# Patient Record
(demographics unavailable — no encounter records)

---

## 2019-06-28 NOTE — CT
CT arteriogram neck with IV contrast and 3-D imaging

CT arteriogram head with IV contrast and 3-D imaging

CT brain with IV contrast



HISTORY: Altered mental status. TIA. Vascular disease.



FINDINGS: Mild emphysematous changes at the lung apices. Normal branching of the great vessels at the
 aortic arch with minimal arterial calcification. Good flow into and throughout each carotid and

vertebral system. Carotid bifurcations are widely patent. Significant calcification and stenosis limi
jeanette to the supraclinoid portion of the internal carotid arteries bilaterally. Good flow into each

cerebral and cerebellar system. [place] is patent.







IMPRESSION: Atherosclerosis. No acute vascular abnormalities or acute intracranial abnormalities are 
demonstrated.







Findings were called to Dr. Nam in the emergency department at 1151 hours.



Code CR.



Reported By: MAXWELL Hudson 

Electronically Signed:  6/28/2019 11:58 AM

## 2019-06-28 NOTE — RAD
THREE VIEWS LEFT WRIST:

6/28/19

 

HISTORY: 

Fall. Pain. 

 

FINDINGS: 

Extensive atherosclerotic disease. Intercarpal and radiocarpal joint space is preserved. No fracture.
 No cortical irregularity or periosteal reaction.

 

IMPRESSION: 

1.      No fracture. 

2.      Atherosclerotic disease.

 

POS: PPP

## 2019-06-28 NOTE — CT
CT BRAIN WITHOUT CONTRAST:



HISTORY:Left-sided weakness, level 2 stroke, slurred speech and drooping of the left-sided amount



COMPARISON:3/20/2019



FINDINGS:

There are foci of decreased attenuation in the periventricular white matter, consistent with chronic 
small vessel ischemic disease. Old infarctions in the right frontal and parietal lobes are again

seen.



No evidence of acute infarct, hemorrhage, midline shift or abnormal extra-axial fluid collections is 
seen. The ventricular size is appropriate and the basilar cisterns are patent. The bony calvarium

is intact.



The visualized paranasal sinuses and mastoid air cells are well aerated.



IMPRESSION: No CT evidence of acute intracranial process.



Discussed over the telephone with ER physician Dr. Rc Nam at 11:40 AM



Reported By: Anup Moreira 

Electronically Signed:  6/28/2019 11:42 AM

## 2019-06-28 NOTE — CT
CT cervical spine noncontrast



HISTORY: Fall. Neck injury.



FINDINGS: Vertebral body heights are maintained. Disc space narrowing most pronounced at the C3-4, C4
-5, and C6-7 levels. Minimal degenerative spondylolisthesis at the C5-6 level. Minimal degenerative

retrolisthesis at the C6-7 level. Multilevel severe central canal and foraminal stenoses. Central can
al stenosis most severe at the C4-5 level.



Cervicothoracic junction is intact. No acute fracture or dislocation.







IMPRESSION: Prominent degenerative changes throughout the cervical spine. No acute osseous abnormalit
ies are demonstrated.



Reported By: MAXWELL Hudson 

Electronically Signed:  6/28/2019 11:42 AM

## 2019-06-28 NOTE — RAD
FOUR VIEWS LEFT ELBOW:

6/28/19

 

HISTORY: 

Pain. Fall.

 

FINDINGS: 

No joint effusion. No fracture. No cortical irregularity or periosteal reaction.

 

IMPRESSION: 

No posttraumatic change. 

 

POS: PPP

## 2019-06-29 NOTE — CON
DATE OF CONSULTATION:  



REASON FOR CONSULTATION:  Clear cervical spine.



REQUESTING PHYSICIAN:  Dr. Goddard.



HISTORY OF PRESENT ILLNESS:  The patient is a 57-year-old  man, 
who

reportedly fell out of bed.  The patient is known to have dense paralysis on his

right side.  He thought he was able to roll in bed, but misjudged and had 
fallen out

of bed, hitting his right forehead, was noted to have some altered mental status

changes and some decreased  strength on the left.  The patient was brought 
to

the emergency department where he underwent evaluation, examination for a level 
2

stroke.  His CT evaluations were unremarkable for signs of acute stroke.  He was

having some neck pain, so he was left in an [place] collar.  We have been asked to

evaluate his C-spine to see, if he can be removed from his collar. 



ALLERGIES:  NONE.



CURRENT MEDICATIONS:  

1. Simvastatin.

2. Metformin.

3. Glipizide.

4. Lisinopril.

5. Keppra.

6. Aspirin.

7. Pioglitazone.



PAST MEDICAL HISTORY:  Hyperlipidemia, ischemic CVA, TIA, diabetes, 
hypertension.



PAST SURGICAL HISTORY:  Left lower extremity BKA due to a forklift accident.



SOCIAL HISTORY:  The patient denies drug use for the past 20 years.  He denies

alcohol and is a former smoker, though does occasionally smoke cigars. 



FAMILY MEDICAL HISTORY:  Hypertension, diabetes.



REVIEW OF SYSTEMS:  A 10-point review of systems is negative as otherwise 
stated.



PHYSICAL EXAMINATION:

GENERAL:  The patient is currently resting comfortably in a stroke observation 
bed.

He is awake, responsive.  Shane Coma Scale is 15. 

HEENT:  Head, he has a laceration to the right forehead just supraorbital that 
has

been repaired in the ER.  Eyes are PERRLA.  Extraocular motion intact.  Ears are

atraumatic without discharge.  Externally, there is some dry crusted blood in 
the

right naris.  Oropharynx is clear. 

NECK:  Immobilized in an [place] collar.  Palpation to the posterior neck reveals

tenderness to the midline and also some left greater than right paraspinous

tenderness.  His cervical collar was returned and secured.  It is well fitting,

there is no tracheal deviation, jugular vein distention. 

LUNGS:  Clear to auscultation with good inspiratory and expiratory effort. 

HEART:  Regular rate and rhythm. 

ABDOMEN:  Soft, flat and nontender with active bowel sounds. 

PELVIS:  Stable. 

EXTREMITIES:  As previously noted, left BKA.  Right upper and lower extremity 
dense

paralysis.  The patient has what appears to be a significant flexion 
contracture on

the right, also keeping his arm crossed at his chest.  The left upper extremity

shows weak, but mobile at the shoulder with abduction, abduction.  The patient 
has

flexion and extension of the elbow, flexion and extension weakly at the wrist,

primarily dorsiflexion of the wrist, has 1 to 2+ strength of  and extension 
of

his fingers. 

BACK:  Nontender.



LABORATORY FINDINGS:  White blood cell count 10.0, hemoglobin 12.0, hematocrit 
36.6,

platelets 184.  Sodium 138, potassium 4.5, chloride 109, CO2 of 20, BUN 12,

creatinine 0.79, glucose 119.  LFTs are unremarkable.  Troponin is less than 
0.010.

PT 13, INR 1.0, PTT 29. 



RADIOGRAPHIC REPORTS:  

1. CT of the brain without contrast shows no CT evidence of acute intracranial

process. 

2. CT of the C-spine and head with IV contrast shows atherosclerosis.  No acute

vascular abnormality or acute intracranial abnormalities demonstrated. 

3. CT of the C-spine without contrast shows prominent degenerative changes

throughout the cervical spine.  No acute osseous abnormalities are 
demonstrated. 



ASSESSMENT/PLAN:  

1. Status post fall.

2. Altered mental status, resolving.

3. Forehead laceration.

4. Cervicalgia.

5. Left upper extremity neurapraxia.



PLAN:  Will be to get plain radiographs of his elbow and left wrist to evaluate 
for

possible fractures causing radial nerve type injury, doubtful.  We will re-
evaluate

the patient tomorrow.  We will leave his [place] collar in place.  If he does not 
have

significant improvement overnight or if there is a change, consider MRI of the

C-spine and possibly shoulder, though these can certainly be done in a 
nonemergent

fashion. 







Job ID:  [id number]



[facility]

## 2019-06-29 NOTE — MRI
Exam: MRI cervical spine without contrast



HISTORY: Recent fall. Extremity weakness..



COMPARISON: None



FINDINGS:

 Heterogeneous marrow signal intensity of the cervical vertebra likely due to senescent change. Cervi
xiomara spine vertebral body height is maintained. No fracture.

3 mm of retrolisthesis of C4 upon C5, 3 mm of retrolisthesis C6 upon C7.

No significant STIR hyperintensity to suggest vertebral body edema or ligamentous injury. There are t
ype I Modic changes at the C4-C5.

Visualized brain parenchyma, cervicomedullary junction and the upper thoracic cord have a normal size
 and signal intensity. Starting at the superior endplate of C5 and extending inferiorly to the

superior endplate of C6 there is a central T2 hyperintensity predominantly involves the central gray 
matter and preserved the peripheral white matter. Cord edema favored. There is no cord expansion or

volume loss.



C2-C3: No significant central canal stenosis. Mild right foraminal narrowing. Left neural foramen is 
patent.



C3-C4: Broad-based disc osteophyte complex effaces the ventral subarachnoid space and deforms the jb
tral cord. Moderate central canal stenosis. Moderate bilateral foraminal narrowing due to

uncovertebral hypertrophy



C4-C5: Broad-based disc osteophyte complex deforms the thecal sac and causes mass effect upon the cer
vical cord. Moderate to severe central canal stenosis. Severe right and moderate to severe left

foraminal narrowing due to uncovertebral hypertrophy.



C5-C6: Broad-based disc osteophyte complex abuts the thecal sac. Ventral subarachnoid space is still 
maintained. Mild central canal stenosis. Mild right foraminal narrowing. Left neural foramen is

patent



C6-C7: Broad-based disc osteophyte complex abuts the thecal sac. Ventral subarachnoid space is mainta
ined. Mild central canal stenosis. Moderate bilateral foraminal narrowing due to uncovertebral

hypertrophy.



C7-T1: Broad-based discussed by complex abuts the thecal sac. Subarachnoid space is maintained. Mild 
to moderate central canal stenosis. Moderate to severe bilateral foraminal narrowing due to

uncovertebral hypertrophy.



IMPRESSION:

 

1. Degenerative changes of the cervical spine as detailed above.

2. No evidence of a cervical spine fracture

3. Central T2 hyperintensity involving the cervical cord at C5 and C6, favored to be due to edema. Th
ere is no evidence of cord expansion to suggest a malignant process. There is no evidence of volume

loss is suggested cord malacia. As a conservative measure, follow-up imaging can be performed in one 
month.



Results of study discussed with Dr. Ventura   on 6/29/19  at 4:29 p.m.



Code CR



Transcribed Date/Time: 6/29/2019 5:32 PM



Reported By: Agueda Francois 

Electronically Signed:  6/29/2019 5:35 PM

## 2019-06-29 NOTE — MRI
Exam: MRI thoracic spine without contrast



History: Weakness. Fall. Pain.



Comparison: None



FINDINGS:

Heterogeneous marrow signal intensity of the thoracic vertebra likely representing senescent change. 
Vertebral body height is maintained. There is no fracture. No significant STIR hyperintensity to

suggest vertebral body edema or ligamentous injury.

Visualized mediastinum, and solid organs are grossly unremarkable. Dependent atelectatic changes in t
he lung parenchyma.

The overall AP diameter the central spinal canal is narrowed secondary to congenitally foreshortened 
pedicles

There are degenerative changes in the distal cervical spine. Refer to separate cervical spine MRI rep
ort for further detail

Throughout the thoracic spine, there is no evidence of high-grade central canal stenosis. There is so
me mass effect in the right hemicord at T3-T4 with slight deformity of the cord secondary to a

small right paracentral disc. There is no evidence of cord expansion. No definite cord malacia. No co
rd signal abnormality. Conus medullaris terminates beyond the T12 level.



IMPRESSION:

1. No evidence of a thoracic spine fracture

2. Overall decreased AP diameter of the central spinal canal secondary to congenitally foreshortened 
pedicle. No evidence of high-grade central canal stenosis or high-grade foraminal narrowing.

3. No cord signal abnormality.



Transcribed Date/Time: 6/29/2019 5:35 PM



Reported By: Agueda Francois 

Electronically Signed:  6/29/2019 5:38 PM

## 2019-06-29 NOTE — MRI
Exam: Brain MRI without contrast



HISTORY: Left-sided weakness. Slurred speech. Left facial droop.



COMPARISON: 3/21/2019



FINDINGS: 

Calvarial marrow signal intensity: Appropriate T1 signal

Gradient echo sequence: No acute hemorrhage. Stable hypointense foci on the axial gradient echo seque
nce involving the left caudate nucleus and right subependymal region due to remote insult.

Brain parenchyma: No mass, mass effect or midline shift. Age-appropriate atrophy. Focus of malacic an
d gliotic changes involving the right frontoparietal region.

Cortical gray-white matter differentiation: Preserved

Restricted diffusion: Central arterial flow voids are maintained. Absent restricted diffusion

White matter signal intensities: T2, FLAIR white matter hyperintensities due to chronic small vessel 
ischemic changes



Sinuses: Adequate aeration of the paranasal sinuses and mastoid air cells. 





IMPRESSION:

1. Absent restricted diffusion. No acute infarct

2. Stable chronic small vessel ischemic changes. Stable malacic and gliotic change involving the righ
t frontoparietal region.



Reported By: Agueda Francois 

Electronically Signed:  6/29/2019 1:49 PM

## 2019-06-30 NOTE — CON
DATE OF CONSULTATION:  06/29/2019



HISTORY OF PRESENT ILLNESS:  This is a 57-year-old  man with a

previous history of multifocal cerebrovascular accidents, which dates back to 2014, 2017 and most recently March of 2019.  This has left the patient with residual right

hemiparesis.  The patient is also status post left BKA and has limited mobility

using left limb prosthesis.  He was admitted yesterday following a fall off a bed.

The patient apparently rolled off a bed, landing hard on his right side and was

discovered by family several hours after the incident.  At baseline, the patient is

able to feed himself holding the spoon using the left hand.  Following the fall

beyond his baseline right hemiparesis, the patient has developed worsening left

sided weakness, especially with respect to the left upper extremity.  He is now

unable to make a fist or hold on to objects. 

His workup here has included a CT scan of the brain, which revealed no acute

traumatic injury or acute cerebrovascular accidents.  CT angiography of the brain

was also obtained, which revealed no acute vascular pathology.  CT scan of the

cervical spine reveals no acute fractures or dislocation.  I was asked to evaluate

the patient today to exclude other traumatic injuries.  At the time of my

evaluation, the patient is awake and alert.  His sister was at bedside.  His

cervical spine was maintained in a cervical collar.  The patient was complaining of

residual back and right lateral neck pain.  He denies any chest or abdominal pain.

He denies any dyspnea or syncope. 



PAST MEDICAL HISTORY:  Significant for multifocal cerebrovascular accidents with

residual right hemiparesis.  Other pertinent medical history includes type 2

diabetes mellitus, essential hypertension, and hyperlipidemia. 



PAST SURGICAL HISTORY:  Significant for left below-the-knee amputation following

work-related forklift accident.  He is also status post drainage of neck abscess. 



FAMILY HISTORY:  Pertinent for diabetes mellitus and essential hypertension.



SOCIAL HISTORY:  The patient is currently disabled.  He has no cigarette smoking,

ethanol, or illicit drug abuse aside from remote history of marijuana use. 



PRE-HOSPITAL MEDICATIONS:  Includes simvastatin 10 mg p.o. nightly, lisinopril 20 mg

p.o. daily, pioglitazone 30 mg p.o. daily, metformin 1000 mg p.o. b.i.d., Keppra 500

mg p.o. b.i.d., aspirin 325 mg p.o. daily and glipizide 5 mg p.o. b.i.d. 



ALLERGIES:  THE PATIENT HAS NO KNOWN DRUG ALLERGIES.



REVIEW OF SYSTEMS:  Essentially unremarkable except as stated in past medical

history and chief complaint. 



PHYSICAL EXAMINATION:

GENERAL:  Reveals a 57-year-old man with a previous history of cerebrovascular

accident who has right hemiparesis, otherwise appears to be in no acute distress at

the time of my evaluation. 

VITAL SIGNS:  His vital signs today includes blood pressure of 103/58, pulse 86,

respiratory rate is 15, temperature 99.3 degrees Fahrenheit, and oxygen saturation

97% on room air. 

HEENT:  Reveals normocephalic and atraumatic.  Pupils are equal, round, reactive to

light and accommodation. 

NECK:  He has no jugular venous distention noted.  Cervical spine is nontender to

palpation.  He has slight tenderness in the right lateral neck soft tissue.  He has

no cervical neck tenderness to active or passive range of motion. 

CHEST:  Chest wall is stable.  No gross deformities or step-offs are present. 

HEART:  Reveals regular rate and rhythm.  No murmurs or gallops auscultated. 

LUNGS:  Clear to auscultation bilaterally.  Breathing, regular and nonlabored. 

ABDOMEN:  Soft, nontender, nondistended. 

EXTREMITIES:  Reveal 2+ bilateral radial and right pedal pulses present.  Left BKA

stump is healed. 

MUSCULOSKELETAL:  Reveals 2/5 muscle strength in right upper extremity.  He has 3/5

muscle strength in the left upper extremity.  He is unable to make a fist.  He has

good deltoid and biceps function, but minimum intrinsic function of the left hand.

His right upper extremity is contractured from previous stroke.  Right lower

extremity motor function is 2/5. 

NEURO:  Shane Coma Scale otherwise is 15.



LABORATORY FINDINGS:  Today include a CBC with 5900 white blood cells, hemoglobin

and hematocrit 10.3 and 29.8 respectively.  Platelet count is 196,000.  Metabolic

profile; sodium 137, potassium 3.8, chloride is 107, bicarb is 21, BUN is 12,

creatinine is 0.77, glucose 108. 



IMAGING DATA:  I have personally reviewed all the previous cervical spine images,

which are essentially unremarkable for any acute pathology.  Brain MRI, which was

obtained today reveals stable multiple foci cerebral infarctions.  No acute

pathology was evident.  MRI of the cervical spine is remarkable for a broad-based

disk osteophyte in C3-C4 and C4-C5 causing moderate to severe canal stenosis.  Also

noted is evidence of spinal cord edema at C5 and C6.  There is no evidence of acute

fractures present.  MRI of the thoracic spine is unremarkable for any fractures or

dislocation. 



IMPRESSIONS:  

1. Status post fall.

2. Severe cervical spinal stenosis with associated central cord syndrome.

3. History of previous multifocal cerebral infarctions.

4. History of essential hypertension.

5. History of type 2 diabetes mellitus.



RECOMMENDATIONS:  

1. Cervical collar will be discontinued.

2. We will initiate physical and occupational therapy.

3. We will ask Neurosurgery to evaluate the patient with regard to the cervical

spinal stenosis and associated central cord syndrome. 

4. We will also ask PM and R to evaluate the patient eventually for possible

inpatient rehabilitation post discharge. 



Above findings and plan has been discussed with the patient and his sister at

bedside. 



I did answer their questions. 



Thank you again, Dr. Goddard, for allowing me the opportunity to participate in the

care of this patient. 







Job ID:  [id number]

## 2019-06-30 NOTE — PRG
DATE OF SERVICE:  06/30/2019



SUBJECTIVE:  Mr. Yeager is a 57-year-old  man, who was recently

admitted following a fall off the bed. 



He has central cord syndrome. 



He remains awake and alert today.  In fact, he is more interactive this morning with

me. 



His pain is adequately controlled on oral analgesics. 



He is tolerating diet. 



He remains with right hemiparesis. 



His left arm is a little stronger today, although he is still unable to make a fist.



OBJECTIVE:  VITAL SIGNS:  Include blood pressure 166/81, pulse 79, respiratory rate

16, temperature 97.8 degrees Fahrenheit, and oxygen saturation 98% on room air. 

HEART:  Reveals regular rate and rhythm.  No murmurs or gallops auscultated. 

LUNGS:  Clear to auscultation bilaterally.  Breathing, regular and nonlabored. 

NEUROLOGIC:  Stable.



IMPRESSION:  

1. Multiple-level spinal stenosis with central cord syndrome, status post fall off a

bed. 

2. History of multiple cerebrovascular accidents.



PLAN:  The patient is otherwise hemodynamically stable. 



Neurosurgery plans ACDF for later part of this coming week.







Job ID:  [id number]

## 2019-06-30 NOTE — RAD
Cervical spine 2 views flexion and extension



HISTORY: Preop planning.



COMPARISON: None



FINDINGS: Flexion-extension views of the cervical spine were forearm without evidence of abnormal mot
ion. Moderate arthritic changes of the spine are seen with degenerative disc narrowing most

pronounced at C4-5.



IMPRESSION: Arthritic changes of the spine.



Reported By: Goyo Prakash 

Electronically Signed:  6/30/2019 1:00 PM

## 2019-06-30 NOTE — PDOC.PN
- Subjective


Encounter Start Date: 06/30/19


Encounter Start Time: 08:54





Feels ok.  No specific complaints.





- Objective


Resuscitation Status - Order Detail:





06/28/19 16:45


Resuscitation Status Routine 


   Resuscitation Status: FULL: Full Resuscitation








Vital Signs & Weight: 


 Vital Signs (12 hours)











  Temp Pulse Resp BP Pulse Ox


 


 06/30/19 07:00  98.1 F  85  16  134/77  99


 


 06/30/19 03:52  98.4 F  70  16  140/77  96


 


 06/29/19 23:33  98.2 F  68  12  98/58 L  97








 Weight











Weight                         152 lb 4 oz














I&O: 


 











 06/29/19 06/30/19 07/01/19





 06:59 06:59 06:59


 


Intake Total 200 1150 


 


Output Total 300  


 


Balance -100 1150 











Result Diagrams: 


 06/29/19 05:59





 06/29/19 05:59


Additional Labs: 


 Accuchecks











  06/30/19 06/29/19 06/29/19





  05:44 21:01 16:52


 


POC Glucose  182 H  157 H  159 H














  06/29/19





  10:38


 


POC Glucose  146 H














Phys Exam





- Physical Examination


Constitutional: NAD


Respiratory: no wheezing, no rales, no rhonchi


Cardiovascular: RRR, no significant murmur


Gastrointestinal: soft, non-tender, no distention, positive bowel sounds


Musculoskeletal: no edema


L BKA


R hemiplegia.  L fingers non-functional.  LUE weakness.





Dx/Plan


(1) Cord compression syndrome


Code(s): G95.20 - UNSPECIFIED CORD COMPRESSION   Status: Acute   





(2) History of CVA (cerebrovascular accident)


Code(s): Z86.73 - PRSNL HX OF TIA (TIA), AND CEREB INFRC W/O RESID DEFICITS   

Status: Acute   





(3) History of left below knee amputation


Code(s): Z89.512 - ACQUIRED ABSENCE OF LEFT LEG BELOW KNEE   Status: Acute   





(4) DM2 (diabetes mellitus, type 2)


Status: Chronic   





(5) Dyslipidemia


Code(s): E78.5 - HYPERLIPIDEMIA, UNSPECIFIED   Status: Chronic   





(6) HTN (hypertension)


Code(s): I10 - ESSENTIAL (PRIMARY) HYPERTENSION   Status: Chronic   


Qualifiers: 


 





- Plan





* Cervical cord compression.


* Discussed with Trauma and Neurosurg.


* Plan is for surgery on Thursday.  Patient was taking Plavix and took his last 

dose on Thursday.


* On steroids.


* Will change famotidine to PPI.


* Will change the Heparin to Lovenox for DVT prophylaxis.  Will not to stop on 

Wed am.

## 2019-06-30 NOTE — CON
DATE OF TELEMEDICINE CONSULTATION WITH ANGELICA IBARRA:  19



CHIEF COMPLAINT:  Possible stroke.



HISTORY OF PRESENT ILLNESS:  The patient's fiancee was in the room and was able 
to

give me medical history.  The patient was in sleep and around 7 or 8 a.m. this

morning, he was rolling over and he thought he was further from the edge, but 
fell

down and he was noted to have left-sided weakness by the ER physician.  At 
baseline,

he has slurred speech and right-sided weakness from a stroke 2 months ago and 
his

left side had become weak since the fall and he is unable to use his left hand

particularly. 



PREVIOUS MEDICAL HISTORY:  Positive for acute CVA about 2 months ago and he also

hypertension, hyperlipidemia, and diabetes. 



PREVIOUS SURGICAL HISTORY:  Left below-knee amputation secondary to a forklift

accident, status post drainage of a neck abscess. 



FAMILY HISTORY:  Positive for diabetes in his brother.  He has 2 brothers and 2

sisters.  His father  at 48.  He was sick on the job and they think he had

cancer.  Mother is 73.  She has type 1 diabetes. 



SOCIAL HISTORY:  Lives in [place].  He is disabled.  He has no history of 
alcohol or

tobacco use.  He does not use any drugs.  He lives with his fiancee. 



CURRENT MEDICATIONS:  

1. Simvastatin 10 mg per day.

2. Metformin 1000 mg b.i.d.

3. Glipizide 5 mg b.i.d.

4. Lisinopril 10 mg per day.

5. Aspirin 81 mg per day.

6. Pioglitazone 30 mg per day.



ALLERGIES:  NO KNOWN DRUG ALLERGIES.



REVIEW OF SYSTEMS:  PULMONARY:  Negative for shortness of breath or cough. 

GASTROINTESTINAL:  Negative for any nausea, vomiting, or diarrhea. 

GENITOURINARY:  No increase in urinary frequency. 

NEUROLOGIC:  Positive for CVA and left-sided weakness. 

DERMATOLOGIC:  Negative for any rash. 

HEMATOLOGIC:  Negative for bleeding diathesis or anemia.



LABORATORY WORKUP:  White count 5.9, hemoglobin 10.3, hematocrit 29.8, platelets

196.  Chemistry; sodium 137, potassium 3.8, chloride 107, bicarb 21, BUN 12,

creatinine 0.77, glucose 108.  Cholesterol and lipid profile are negative.  MRI 
of

the brain was completed and he did not have an acute stroke.  He does have 
chronic

small-vessel ischemic changes, stable malacic and gliotic change involving the 
right

frontoparietal region and he had a cervical spine MRI which showed degenerative

changes of the C-spine, central T2 hyperintensity in the cervical cord at C5-6,

favored to be due to edema.  There is no evidence of cord expansion suggesting

malignant process.  No evidence of volume loss and thoracic spine MRI was also

completed and there was no evidence of any thoracic spine fracture.  He has

congenitally foreshortened pedicle, therefore has decreased diameter of central

spinal canal and no cord signal abnormality was noted. 



PHYSICAL EXAMINATION:

VITAL SIGNS:  Temperature 99.3, pulse 86, respiratory rate 15, O2 sats 97%, 
blood

pressure 103/58. 

GENERAL APPEARANCE:  Well-built, well-nourished man.  He has a laceration in the

right forehead and has left below-knee amputation. 

CHEST:  Clear vesicular breathing. 

CARDIOVASCULAR:  S1, S2 heard.  No murmurs. 

ABDOMEN:  Soft. 

NEUROLOGICAL EXAMINATION:  Higher intellectual functions.  Oriented to time, 
place,

and person and appropriate conversation.  Cranial nerves, pupils are 3 mm, 
briskly

reactive to light bilaterally and no facial asymmetry noted.  Normal sensation 
of

face bilaterally and tongue midline.  No atrophy noted.  Motor examination, bulk

normal, tone normal.  Distal left lower extremity exam is not possible.  Motor

examination, strength was diminished on the right side.  He has a contracture 
in the

right upper extremity at the elbow and strength was 3/5 on the right side in the

upper extremity and lower extremity was 2/5.  He had difficulty elevating his 
right

lower limb against gravity and left upper extremity proximal strength was 5/5,

distal was 3/5.  He had a wrist drop and deep tendon reflexes were absent.  
Muscle

groups tested were deltoid, biceps, triceps, wrist extension and flexion, finger

extension and flexion, iliopsoas, hamstrings, quadriceps, ankle dorsiflexion,

plantar flexion on the right side and iliopsoas on the left side.  Cerebellar,

difficult to perform on the left, but in upper extremities, finger-to-nose was

normal.  Sensory was intact throughout. 



IMPRESSION:  The patient is a 57-year-old man with a stroke and he developed

left-sided weakness since the fall.  His MRI does show a right parietofrontal

infarct and he also has abnormalities in the MRI of the C-spine.  He likely had 
a

cord contusion.  At this time, he does not have evidence for another acute 
stroke. 



RECOMMENDATIONS:  

Neurosurgery and trauma team to review his MRI C-spine for recommendations.

Please  have him follow up with Dr. Goodman as outpatient once he is discharged
, 

so he can have EMG and nerve conduction study. 







Job ID:  [id number]



[id number]

## 2019-06-30 NOTE — PRG
DATE OF SERVICE:  06/30/2019



SUBJECTIVE:  I personally interviewed and examined the patient and agreed with

documentation of Matilde Reyes PA-C, dated 06/29/2019. 



Briefly, Ray Yeager is a 57-year-old gentleman with a remote history of stroke

leaving him with some right hemiparesis in the past.  In March of this year, he was

readmitted to [facility] with a new right pontine

stroke resulting in some dysarthria and left-sided weakness.  During that

hospitalization, he was seen by our colleagues in Neurology, who started Plavix as a

medication.  That Plavix was continued in the outpatient rehabilitation setting and

his family reports that he has been taking that at home.  His last dose of the

Plavix medication was Thursday.  He fell in the early hours of the morning on

Friday, trying to get out of bed and because of the fall held any blood thinners.

He has been in the hospital since Friday (two days ago). 



Mr. Yeager falls what brought him to the hospital and did notice worsening of his

left-sided weakness.  MR imaging of the cervical spine was done during this

hospitalization revealing severe C4-C5 cord compression and moderate cord

compression at C3-C4, with spondylosis elsewhere, but these two segments are the

most compressed.  There was T2 signal change within the cord and development of

encephalomalacia already within the substance of the cord.  For this reason,

Neurosurgery was consulted. 



Since yesterday, blood pressures have been in the 90s to 150s.  No fevers that I

could see.  On examination, Mr. Yeager has upper motor neuron weakness on both

sides, but he has a quite a dense right canelo plegia from previous stroke.  He has

pathologically brisk reflexes.  Even has an upper motor neuron spasticity of the

vocal cords making his voice high-pitched and labored, but he can speak well.  I

think his cognitive function is unaffected. 



ASSESSMENT AND PLAN:  I had a long discussion with Mr. Yeager and his family.  I

recommended decompressing the cervical cord to prevent future deterioration.  He may

get some increase in function, but given the change within the substance of the

cord, much of this is going to be permanent.  However, he has more to lose and for

that reason, I have recommended surgery. 



Surgery currently is unsafe given the recent Plavix use.  Once the Plavix has had a

weak to wear off, I think we can schedule surgery more safely.  In the meantime, we

will get flexion-extension views that are done gently to help plan surgical

intervention.  If there is other instability, we will change our surgical plan, but

right now, I am planning an ACDF at C4-C5 and potentially at C3-C4 as well.

Thursday or Friday of this coming week or the following Monday or Tuesday seem

reasonable. 







Job ID:  [id number]

## 2019-06-30 NOTE — CON
DATE OF CONSULTATION:  



HISTORY AND PHYSICAL ILLNESS:  Mr. Yeager is a 57-year-old male with past medical

history of multiple ischemic strokes, left below-the-knee amputation with

right-sided paralysis and contractures, this is primarily in the right upper

extremity, who was admitted to the hospital yesterday following an accident where he

had rolled out of bed, striking his face on the ground.  The patient had new left

hand primarily weakness following this injury.  The patient was admitted.  He was

brought in and imaging has been completed of his brain to workup for any new stroke.

 MRI was negative for new disease. However, MRI of the cervical spine was done

showing significant stenosis with some edema within the spinal cord.  Neurosurgery

has been consulted.  When I entered the patient's hospital room this evening, he is

resting comfortably.  He is easily arousable.  He has a contracture of the right

upper extremity, he is not moving his right lower extremity, below the knee

amputation on the left, but has good strength and sensation in the left lower

extremity.  The patient can move the left upper extremity deltoids and biceps,

weakness in triceps, and wrist extension, finger intrinsics, and finger extension

are almost nonexistent.  He does have normal sensation on the left.  The patient

does have some facial droop on the left as well, but is alert and oriented. 



REVIEW OF SYSTEMS:  A 10-point review of systems has been completed and is negative

other than stated in the above HPI. 



PAST MEDICAL HISTORY:  Ischemic cardiovascular accident with residual right

hemiparesis, hypertension, hyperlipidemia, and diabetes mellitus. 



PAST SURGICAL HISTORY:  Left below-the-knee amputation secondary to forklift

accident and status post drainage of a neck abscess. 



FAMILY HISTORY:  Hypertension and diabetes.



SURGICAL HISTORY:  Lives in [place], who is disabled, has remote use of marijuana.

No alcohol use.  No tobacco use.  He is a full code. 



MEDICATIONS:  Simvastatin, metformin, glipizide, lisinopril, aspirin, and

pioglitazone. 



PHYSICAL EXAMINATION:

VITAL SIGNS:  Temperature 98.4, heart rate 69, respirations 12, O2 saturations 96%

on room air, blood pressure is 99/62. 

CONSTITUTIONAL:  The patient does not appear to be in any visible distress.  He is

awake, alert, and oriented.  He appears to be nontoxic, normotensive, and afebrile. 

HEENT:  Head is normocephalic and atraumatic.  Pupils are equal, round, and reactive

to light.  Extraocular movements are intact.  Hearing is intact.  Moist mucous

membranes. 

NECK:  There is no tenderness.  The patient has normal range of motion to palpation. 

RESPIRATORY:  Normal work of breathing on room air.  Symmetric chest rise. 

EXTREMITIES:  Left below-the-knee amputation.  Good strength and sensation in hip

flexion and hip extension.  Right lower extremity; the patient has decreased

movement due to paralysis.  Upper extremities; contracture on the right upper

extremity, and left upper extremity, the patient has deltoid and biceps strength

which is 5/5, triceps strength 4-/5, wrist extension 3+/5, finger extension and

finger intrinsics 1/5.  There is normal sensation on the left upper extremity and

lower extremity. 

NEUROLOGIC:  The patient is awake, alert, and oriented x3.  Speech is slightly

slurred, but baseline.  Attention and concentration, normal. 



IMAGING DATA:  MRI of the cervical spine shows degenerative changes in the cervical

spine.  No evidence of fracture, central T2 hyperintensity involving cervical cord

at C5-C6, there is significant stenosis at multiple levels. 



MRI of the thoracic spine has been completed, which shows no evidence of thoracic

spine fracture, some congenital spinal cord narrowing, but no evidence of

significant stenosis or cord compression. 



ASSESSMENT AND PLAN:  Mr. Yeager is a 57-year-old male with significant past medical

history of cardiovascular accident, leaving him with right-sided paralysis, left- 

sided below-the-knee amputation, new injury, fall, possible cervical spinal cord

injury this is a day and half old already.  We will recommend starting steroids and

we will consider possible surgical intervention tomorrow.  We will need to discuss

risks and benefits with the patient given his history and limitations following.  If

there are any questions, please contact the Neurosurgery Department. 





Job ID:  [id number]

## 2019-07-01 NOTE — PDOC.PN
- Subjective


Encounter Start Date: 07/01/19


Encounter Start Time: 09:03





Patient seen and examined, no new issues.





- Objective


Resuscitation Status - Order Detail:





06/28/19 16:45


Resuscitation Status Routine 


   Resuscitation Status: FULL: Full Resuscitation








Vital Signs & Weight: 


 Vital Signs (12 hours)











  Temp Pulse Resp BP Pulse Ox


 


 07/01/19 07:20  98.1 F  66  14  125/69  98


 


 07/01/19 04:38  97.9 F  72  16  137/70  96


 


 07/01/19 01:01  98.9 F  77  18  108/65  95








 Weight











Weight                         152 lb 4 oz














I&O: 


 











 06/30/19 07/01/19 07/02/19





 06:59 06:59 06:59


 


Intake Total 1150 540 


 


Balance 1150 540 











Result Diagrams: 


 06/29/19 05:59





 06/29/19 05:59


Additional Labs: 


 Accuchecks











  07/01/19 06/30/19 06/30/19





  05:26 20:53 16:56


 


POC Glucose  163 H  183 H  130 H














  06/30/19





  10:42


 


POC Glucose  153 H














Phys Exam





- Physical Examination


Constitutional: NAD


HEENT: PERRLA, moist MMs


neck brace in place


Respiratory: no wheezing, no rales, no rhonchi


Cardiovascular: RRR, no significant murmur, no rub


Gastrointestinal: soft, non-tender, no distention


Musculoskeletal: no edema, pulses present





Dx/Plan


(1) History of CVA (cerebrovascular accident)


Code(s): Z86.73 - PRSNL HX OF TIA (TIA), AND CEREB INFRC W/O RESID DEFICITS   

Status: Acute   





(2) Gait instability


Code(s): R26.81 - UNSTEADINESS ON FEET   Status: Acute   





(3) Hemiplegia affecting right dominant side


Code(s): G81.91 - HEMIPLEGIA, UNSPECIFIED AFFECTING RIGHT DOMINANT SIDE   Status

: Acute   


Qualifiers: 


   Hemiplegia type: spastic   Hemiplegia etiology: late effect of 

cerebrovascular disease   Cerebrovascular disease type: cerebral infarction   

Qualified Code(s): I69.351 - Hemiplegia and hemiparesis following cerebral 

infarction affecting right dominant side   





(4) Seizure


Status: Acute   





(5) DM2 (diabetes mellitus, type 2)


Status: Chronic   





(6) Dyslipidemia


Code(s): E78.5 - HYPERLIPIDEMIA, UNSPECIFIED   Status: Chronic   





(7) HTN (hypertension)


Code(s): I10 - ESSENTIAL (PRIMARY) HYPERTENSION   Status: Chronic   


Qualifiers: 


 





- Plan





* cont current plan of care


* patient on plavix, neuro sx intervention planned for later this week or early 

next week


* stable otherwise


* case and plan d/w patient and family at length, they understood and agreed 

with this plan.

## 2019-07-01 NOTE — PRG
DATE OF SERVICE:  07/01/2019



SUBJECTIVE:  The patient remains on the stroke unit.  He is doing well, had no

issues overnight.  He is awaiting decompression for his central cord syndrome.  
We

are seeing him in consult and currently Neurosurgery is looking at either 
Thursday

or Friday for his procedure. 



PHYSICAL EXAMINATION:

VITAL SIGNS:  Temperature is 97.9, heart rate 62, blood pressure 141/76,

respirations 20, oxygen saturation 97% on room air. 

GENERAL:  The patient is resting comfortably in bed.  He is awake and appears 
to be

responsive.  The patient does have significant previous CVA and loss of function

related to that, primarily right hemiparesis.  Left arm is essentially 
unchanged.

The patient still has weakness of his intrinsic muscles of his hand. 

LUNGS:  Clear to auscultation. 

HEART:  Regular rate and rhythm. 

ABDOMEN:  Soft with positive bowel sounds. EXTREMITIES:  As above.



LABORATORY DATA:  There are no labs or radiographs reviewed this morning.



ASSESSMENT/PLAN:  

1. Status post fall.

2. Multiple level spinal stenosis with central cord syndromes.

3. History of multiple cerebrovascular accidents.



PLAN:  Plan will be to continue supportive care and await neurosurgical 
intervention.







Job ID:  [id number]



MTDD

## 2019-07-02 NOTE — PRG
DATE OF SERVICE:  07/02/2019



I saw Mr. Yeager in his hospital room this morning.  He continues to have

significant cervical spondylotic myelopathy on top of deficits from previous

strokes.  We will plan surgical intervention on Thursday.  __________ order

antibiotics.  The medical team is ensuring that he is healthy as possible for

general anesthesia and will get the operation done.  It is an ACDF that is planned. 



Informed Consent:  I discussed indications, risks, benefits, alternatives and

expected outcomes from surgery.  The risks discussed included, but were not limited

to, bleeding, infection, CSF leak, nerve damage, spinal cord injury, paralysis,

ventilator dependence, carotid artery injury, jugular vein injury, permanent

dysphagia, esophageal injury, tracheal injury, and cardiopulmonary complications of

anesthesia including death.  Mr. Yeager understands the risks and wants to proceed. 







Job ID:  [id number]

## 2019-07-02 NOTE — PRG
DATE OF SERVICE:  07/02/2019



This is Lizzy Esteban NP dictating a report for Kwabena Goddard MD.



SUBJECTIVE:  This is a 57-year-old gentleman who remains on the stroke unit.  The

patient had no overnight events.  The patient pending decompression for his central

cord syndrome.  The patient remains in an [place] collar.  The patient continues to

have left-sided weakness, the patient with chronic right-sided hemiparesis. 



OBJECTIVE:  VITAL SIGNS:  Blood pressure 109/63, SpO2 of 99% on room air, pulse 73,

respirations 18, and temperature 98.0. 

GENERAL:  Resting comfortably in bed, the patient awake and alert, in no acute

distress.  The patient has significant right hemiparesis status post CVA.  The

patient still has weakness to the left upper extremity. 

LUNGS:  No respiratory distress, equal chest expansion. 

HEART:  Regular rate and rhythm. 

ABDOMEN:  Soft, nontender, and nondistended. 

EXTREMITIES:  Left arm weakness, right chronic hemiparesis, left below-the-knee

amputation. 



ASSESSMENT:  

1. Status post fall.

2. Multiple level spinal stenosis with central cord syndrome.

3. History of multiple cerebrovascular accidents.



PLAN:  Continue supportive care.  Continue physical and occupational therapy.  Plan

is for Neurosurgery to take the patient for an ACDF on Thursday or Friday of this

week.  The plan has been discussed with the attending, who agrees. 







Job ID:  [id number]

## 2019-07-03 NOTE — PRG
DATE OF SERVICE:  07/03/2019



I saw Mr. Yeager in his hospital room this morning.  I discussed with him the plan

for surgery tomorrow.  I am planning on ACDF at C3-C4 and C4-C5.  Flexion-extension

views did not show any instability below that.  I would like to get maximal

decompression with diskectomy at both levels.  We will make sure that during the

anesthetic, the pressure does not dip terribly low and that we use careful

positioning. 



We will keep him n.p.o. after midnight tonight and order perioperative antibiotics,

to follow him to the operating room. 







Job ID:  [id number]

## 2019-07-03 NOTE — PDOC.PN
- Subjective


Encounter Start Date: 07/03/19


Encounter Start Time: 11:30


Subjective: pt up in bed no complains





- Objective


Resuscitation Status - Order Detail:





06/28/19 16:45


Resuscitation Status Routine 


   Resuscitation Status: FULL: Full Resuscitation








Vital Signs & Weight: 


 Vital Signs (12 hours)











  Temp Pulse Pulse Pulse Resp BP BP


 


 07/03/19 15:50  98.2 F  71    18  


 


 07/03/19 11:59  98.2 F  65    18  


 


 07/03/19 09:47       109/63 


 


 07/03/19 09:03    65  69    117/72


 


 07/03/19 08:00  97.8 F  64    18  














  BP BP Pulse Ox


 


 07/03/19 15:50   100/57 L  99


 


 07/03/19 11:59   142/76 H  97


 


 07/03/19 09:47   


 


 07/03/19 09:03  104/65  


 


 07/03/19 08:00   105/60  98








 Weight











Weight                         152 lb 4 oz














I&O: 


 











 07/02/19 07/03/19 07/04/19





 06:59 06:59 06:59


 


Intake Total 820 1610 600


 


Balance 820 1610 600











Result Diagrams: 


 06/29/19 05:59





 06/29/19 05:59


Additional Labs: 


 Accuchecks











  07/03/19 07/03/19 07/03/19





  17:00 10:36 05:57


 


POC Glucose  92  178 H  187 H














  07/02/19





  19:47


 


POC Glucose  176 H














Phys Exam





- Physical Examination


Neck: no nodes, no JVD, supple, full ROM


Respiratory: no wheezing, no rales, no rhonchi, wheezing present, clear to 

auscultation bilateral


Cardiovascular: RRR, no significant murmur, no rub, gallop, irregular





Dx/Plan


(1) History of CVA (cerebrovascular accident)


Code(s): Z86.73 - PRSNL HX OF TIA (TIA), AND CEREB INFRC W/O RESID DEFICITS   

Status: Acute   





(2) Gait instability


Code(s): R26.81 - UNSTEADINESS ON FEET   Status: Acute   





- Plan


pt to go for surgery in am


-: will continue current treatment





* .








Review of Systems





- Review of Systems


Respiratory: negative: Cough, Dry, Shortness of Breath, Hemoptysis, SOB with 

Excertion, Pleuritic Pain, Sputum, Wheezing


Cardiovascular: negative: chest pain, palpitations, orthopnea, paroxysmal 

nocturnal dyspnea, edema, light headedness, other





- Medications/Allergies


Allergies/Adverse Reactions: 


 Allergies











Allergy/AdvReac Type Severity Reaction Status Date / Time


 


No Known Drug Allergies Allergy   Verified 11/14/14 01:37











Medications: 


 Current Medications





Acetaminophen (Tylenol)  650 mg PO Q4H PRN


   PRN Reason: Headache/Fever/Mild Pain (1-3)


Hydrocodone Bitart/Acetaminophen (Norco 5/325)  1 tab PO Q4H PRN


   PRN Reason: Moderate Pain (4-6)


   Last Admin: 07/01/19 19:54 Dose:  1 tab


Hydrocodone Bitart/Acetaminophen (Norco 5/325)  2 tab PO Q4H PRN


   PRN Reason: Severe Pain (7-10)


   Last Admin: 07/03/19 17:31 Dose:  2 tab


Aspirin (Aspirin)  325 mg PO QAM-[facility]


   Last Admin: 07/03/19 09:46 Dose:  325 mg


Dexamethasone (Decadron)  4 mg PO Q8HR [facility]


   Last Admin: 07/04/19 06:38 Dose:  Not Given


Dextrose/Water (Dextrose 50%)  25 gm IVP PRN PRN


   PRN Reason: HYPOGLYCEMIA PROTOCOL


Enoxaparin Sodium (Lovenox)  40 mg SC 0900 [facility]


   Last Admin: 07/03/19 09:46 Dose:  40 mg


Glipizide (Glucotrol Xl)  5 mg PO BID-[facility]


   Last Admin: 07/03/19 17:01 Dose:  5 mg


Glucagon (Glucagon)  1 mg IM PRN PRN


   PRN Reason: HYPOGLYCEMIA PROTOCOL


Cefazolin Sodium/Dextrose 2 gm (/ Device)  50 mls @ 100 mls/hr IVPB ONCALL-OR 

[facility]


   Stop: 07/04/19 23:59


Dextrose/Water (D5w)  1,000 mls @ 0 mls/hr IV INF PRN


   PRN Reason: HYPOGLYCEMIA PROTOCOL


Insulin Human Lispro (Humalog)  0 units SC .MILD SLIDING SCALE PRN; Protocol


   PRN Reason: MILD SLIDING SCALE


   Last Admin: 07/03/19 05:57 Dose:  2 unit


Levetiracetam (Keppra)  500 mg PO BID [facility]


   Last Admin: 07/03/19 22:12 Dose:  500 mg


Lisinopril (Zestril)  10 mg PO DAILY [facility]


   Last Admin: 07/03/19 09:47 Dose:  10 mg


Metformin HCl (Glucophage)  1,000 mg PO BID-WM [facility]


   Last Admin: 07/03/19 17:01 Dose:  1,000 mg


Pantoprazole Sodium (Protonix)  40 mg PO DAILY [facility]


   Last Admin: 07/03/19 09:47 Dose:  40 mg


Pioglitazone HCl (Actos)  30 mg PO DAILY [facility]


   Last Admin: 07/03/19 09:47 Dose:  30 mg


Polyethylene Glycol (Miralax)  17 gm PO DAILY [facility]


Polyethylene Glycol (Miralax)  17 gm PO DAILYPRN PRN


   PRN Reason: Constipation


Senna/Docusate Sodium (Senokot S)  1 tab PO BID [facility]


   Last Admin: 07/03/19 22:13 Dose:  1 tab


Simvastatin (Zocor)  10 mg PO HS [facility]


   Last Admin: 07/03/19 22:13 Dose:  10 mg


Sodium Chloride (Flush - Normal Saline)  10 ml IVF PRN PRN


   PRN Reason: Saline Flush

## 2019-07-04 NOTE — PDOC.PN
- Subjective


Encounter Start Date: 07/04/19


Encounter Start Time: 12:00





Patient seen and examined, no new issues.





- Objective


Resuscitation Status - Order Detail:





06/28/19 16:45


Resuscitation Status Routine 


   Resuscitation Status: FULL: Full Resuscitation








Vital Signs & Weight: 


 Vital Signs (12 hours)











  Temp Pulse Resp BP Pulse Ox


 


 07/04/19 07:13  97.8 F  68  20  124/68  97


 


 07/04/19 04:00  97.9 F  63  16  129/65  100








 Weight











Weight                         152 lb 4 oz














I&O: 


 











 07/03/19 07/04/19 07/05/19





 06:59 06:59 06:59


 


Intake Total 1610 1050 


 


Balance 1610 1050 











Result Diagrams: 


 06/29/19 05:59





 06/29/19 05:59


Additional Labs: 


 Accuchecks











  07/04/19 07/04/19 07/03/19





  11:52 05:59 19:53


 


POC Glucose  165 H  163 H  196 H














  07/03/19





  17:00


 


POC Glucose  92














Phys Exam





- Physical Examination


Constitutional: NAD


HEENT: PERRLA, moist MMs


Neck: no nodes, no JVD


Respiratory: no wheezing, no rales, no rhonchi


Cardiovascular: RRR, no significant murmur, no rub


Gastrointestinal: soft, non-tender, no distention, positive bowel sounds


Musculoskeletal: pulses present, edema present (trace)





Dx/Plan


(1) History of CVA (cerebrovascular accident)


Code(s): Z86.73 - PRSNL HX OF TIA (TIA), AND CEREB INFRC W/O RESID DEFICITS   

Status: Acute   





(2) Gait instability


Code(s): R26.81 - UNSTEADINESS ON FEET   Status: Acute   





(3) Hemiplegia affecting right dominant side


Code(s): G81.91 - HEMIPLEGIA, UNSPECIFIED AFFECTING RIGHT DOMINANT SIDE   Status

: Acute   


Qualifiers: 


   Hemiplegia type: spastic   Hemiplegia etiology: late effect of 

cerebrovascular disease   Cerebrovascular disease type: cerebral infarction   

Qualified Code(s): I69.351 - Hemiplegia and hemiparesis following cerebral 

infarction affecting right dominant side   





(4) Seizure


Status: Acute   





(5) DM2 (diabetes mellitus, type 2)


Status: Chronic   





(6) Dyslipidemia


Code(s): E78.5 - HYPERLIPIDEMIA, UNSPECIFIED   Status: Chronic   





(7) HTN (hypertension)


Code(s): I10 - ESSENTIAL (PRIMARY) HYPERTENSION   Status: Chronic   


Qualifiers: 


 





- Plan





* Neuro sx intervention pending


* no changes in plan of care

## 2019-07-04 NOTE — PRG
DATE OF SERVICE:  07/04/2019



SUBJECTIVE:  The patient was seen this afternoon postoperatively after receiving

ACDF via Dr. Toussaint after central cord syndrome.  The patient reported pain is

well controlled.  He had no difficulty swallowing or breathing.  Reported stable

right upper extremity weakness and reported left upper extremity weakness was not

changed postoperatively.  He had no complaints at that time. 



OBJECTIVE:  VITAL SIGNS:  Temperature 98.1, pulse 65, respirations 20, oxygen

saturation 99% on room air, blood pressure 131/66. 

GENERAL:  Well-appearing middle-aged male, lying in bed with no signs of acute

distress. 

RESPIRATORY:  Equal chest rise and fall.  Clear breath sounds bilaterally.  No signs

of acute respiratory distress. 

CARDIAC:  Regular rate and rhythm.  No murmurs, gallops, or rubs. 

GI:  Abdomen is soft, nontender, nondistended. 

EXTREMITIES:  2+ pulses in all extremities.  No significant swelling noted.  Right

upper and lower extremity with severe paresis, which is unchanged from previous.

Left upper extremity with 3/5 strength, which is not changed from previous.  Left

lower extremity with BKA from previous accident. 

NEUROLOGIC:  GCS is 15.  Gross motor and sensation have not changed from previously.



LABORATORY FINDINGS:  There are no new laboratory findings to discuss.



DIAGNOSTIC FINDINGS:  There are no new diagnostic findings to discuss.



ASSESSMENT:  

1. Status post fall from bed.

2. Central cord syndrome with left upper extremity weakness.

3. Cerebrovascular accident with residual right-sided weakness, hypertension,

hyperlipidemia, diabetes, and left lower extremity below-the-knee amputation. 



RECOMMENDATIONS:  Continue current pain control per primary team.  Trauma Team

discontinued the patient's full-dose aspirin and Lovenox.  Primary team to discuss

with Neurosurgery as the appropriate time to restart anticoagulation after spinal

surgery.  Trauma Surgery did also contact Neurosurgery Team to inform them of the

patient's aspirin and Lovenox.  Nursing was also informed that aspirin and Lovenox

would be held until Neurosurgery and hospitalist team were in agreement as to when

is the appropriate time to restart the medications.  The patient was discussed with

Dr. Ventura this afternoon postop. 







Job ID:  [id number]

## 2019-07-04 NOTE — OP
DATE OF PROCEDURE:  07/04/2019



ASSISTANT:  Matilde Reyes PA-C



PREOPERATIVE INDICATION:  Prevent further neurological deterioration.



PREOPERATIVE DIAGNOSIS:  Cervical intervertebral disk disease with cord compression

and severe myelopathy. 



POSTOPERATIVE DIAGNOSIS:  Cervical intervertebral disk disease with cord compression

and severe myelopathy. 



PROCEDURE PERFORMED:  Anterior cervical diskectomy with intervertebral arthrodesis,

and placement of intervertebral biomechanical device as well as anterior cervical

plating at C3-C4 and C4-C5, local morselized autograft, morselized allograft, and

operating microscope. 



PREOPERATIVE MEDICATION:  Ancef 2 g IV.



DRAIN NUMBER:  Zero.



DRAIN TYPE:  None.



DESCRIPTION OF PROCEDURE:  The patient was brought to the operating room.  General

endotracheal anesthesia was induced keeping the neck in normal anatomic alignment.

The patient was carefully positioned on the operating room table with his head

supported by a gel-filled doughnut-shaped headrest.  A lateral fluoro radiograph was

used to plan our incision.  The right side of the neck was sterilely prepped and

draped.  We opened our incision with a 10 blade knife and we controlled bleeding

with bipolar cautery.  We dissected sharply to the platysma and we cut this muscle

in line with our incision.  We continued our dissection medial to the

sternocleidomastoid and lateral to the trachea and esophagus.  We arrived at the

prevertebral space and put a marker at C3-C4.  We took a lateral fluoro radiograph

to confirm the levels upon which we were operating.  We then elevated the longus

colli muscles off the anterior surface of C3, C4, and C5.  Self-retaining retractors

were placed beneath these muscles and distraction pins were placed at C3 and C5.  We

distracted across the intervening interspaces.  We incised the interspaces with a 15

blade knife and removed disk contents using curettes and rongeurs.  The operating

microscope was brought into the field. 



Under microscopic magnification and using microsurgical techniques, we removed the

remainder of the intervertebral disk.  We accessed the ventral epidural space with a

microcurette and we used Kerrison rongeurs to remove posterior osteophytes and

posterior longitudinal ligament across the entire interspace at C4-C5 and at C3-C4.

At the completion of our decompression, the dura was no longer indented.  We then

used curettes to prepare the endplates for grafting.  We used a bone rasp to measure

the height of each interspace to 6 mm.  The operative microscope was taken out of

the field. 



Two separate 6-mm PEEK intervertebral grafts were brought into the field.  They were

loaded with demineralized bone matrix and morselized autograft and advanced into the

interspace under radiographic guidance to the appropriate depth.  We removed our

distraction pins.  We brought a 28 mm anterior cervical plate into the field.  We

drilled  holes through the plate into the vertebral bodies at C3, C4, and C5

and we affixed the plate using 14 mm screws.  We used fixed angle screws at C5 and

variable angle screws at C4 and C3.  We engaged the locking mechanism over each of

the 6 screws.  AP and lateral fluoro radiographs confirmed adequate positioning of

our instrumentation.  We irrigated copiously with bacitracin irrigation.  We closed

the wound in anatomical layers and we applied a sterile dressing.  This was a clean

case, no contamination. 







Job ID:  [id number]

## 2019-07-05 NOTE — PRG
DATE OF SERVICE:  07/05/2019



Mr. Yeager is one day out from a two-level ACDF for severe cord compression and

myelopathy.  He does not feel any worse than he did prior to surgery.  He does not

notice significant improvement, either.  Mr. Yeager has had no change in his voice

or swallowing.  I do not see any fevers recorded.  The other vital signs have been

stable overnight.  His neurological examination is exactly as it was with a

combination of some cervical spondylotic myelopathy as well as extremity dysfunction

from prior strokes.  There has been no significant change since before surgery. 



Mr. Yeager is happy at the operation.  Without decompression of the cord, things

would slowly get worse over time and now we have prevented that.  With enough

therapy and time, he could see some modest improvement, but certainly none have

returned to normal. 



Mr. Yeager can shower tomorrow.  The bandage can come off later today or tomorrow

morning.  He can be transferred to inpatient rehabilitation or nursing facility any

time.  We will follow up in 2 weeks. 







Job ID:  [id number]

## 2019-07-05 NOTE — PDOC.PN
- Subjective


Encounter Start Date: 07/05/19


Encounter Start Time: 12:34





Patient seen and examined, no new issues. 





- Objective


Resuscitation Status - Order Detail:





06/28/19 16:45


Resuscitation Status Routine 


   Resuscitation Status: FULL: Full Resuscitation








Vital Signs & Weight: 


 Vital Signs (12 hours)











  Temp Pulse Pulse Pulse Resp BP BP


 


 07/05/19 12:02  98.8 F  74    14  


 


 07/05/19 09:04    67  65   132/60  127/71


 


 07/05/19 07:46  98 F  64    20  


 


 07/05/19 03:06  97.4 F L  66    12  














  BP BP Pulse Ox


 


 07/05/19 12:02  112/61   98


 


 07/05/19 09:04   


 


 07/05/19 07:46  129/69   97


 


 07/05/19 03:06   131/68  97








 Weight











Weight                         152 lb 4 oz














I&O: 


 











 07/04/19 07/05/19 07/06/19





 06:59 06:59 06:59


 


Intake Total 1050 1095 


 


Balance 1050 1095 











Result Diagrams: 


 07/05/19 04:47





 07/05/19 04:47


Additional Labs: 


 Accuchecks











  07/05/19 07/05/19 07/04/19





  10:44 04:49 21:48


 


POC Glucose  227 H  151 H  192 H














  07/04/19





  16:42


 


POC Glucose  186 H














Phys Exam





- Physical Examination


Constitutional: NAD


HEENT: PERRLA, moist MMs


Neck: no nodes, no JVD


Respiratory: no wheezing, no rales, no rhonchi


Cardiovascular: RRR, no significant murmur, no rub


Gastrointestinal: soft, non-tender, no distention, positive bowel sounds


Musculoskeletal: no edema, pulses present





Dx/Plan


(1) History of CVA (cerebrovascular accident)


Code(s): Z86.73 - PRSNL HX OF TIA (TIA), AND CEREB INFRC W/O RESID DEFICITS   

Status: Acute   





(2) Gait instability


Code(s): R26.81 - UNSTEADINESS ON FEET   Status: Acute   





(3) Hemiplegia affecting right dominant side


Code(s): G81.91 - HEMIPLEGIA, UNSPECIFIED AFFECTING RIGHT DOMINANT SIDE   Status

: Acute   


Qualifiers: 


   Hemiplegia type: spastic   Hemiplegia etiology: late effect of 

cerebrovascular disease   Cerebrovascular disease type: cerebral infarction   

Qualified Code(s): I69.351 - Hemiplegia and hemiparesis following cerebral 

infarction affecting right dominant side   





(4) Seizure


Status: Acute   





(5) DM2 (diabetes mellitus, type 2)


Status: Chronic   





(6) Dyslipidemia


Code(s): E78.5 - HYPERLIPIDEMIA, UNSPECIFIED   Status: Chronic   





(7) HTN (hypertension)


Code(s): I10 - ESSENTIAL (PRIMARY) HYPERTENSION   Status: Chronic   


Qualifiers: 


 





- Plan





* pending placement


* cont current plan of care for now


* medically stable to resume antiplatet drugs once cleared by neuro sx


* no other changes in plan of care


* CM consulted for placement


* case and plan d/w patient at length, he understood and agreed with this plan.

## 2019-07-05 NOTE — PRG
DATE OF SERVICE:  07/05/2019



SUBJECTIVE:  The patient was seen this morning lying in bed.  Physical Therapy had

just arrived to his room.  He is postoperative day 1 after C-spine ACDF.  He reports

pain is well controlled.  He has not had breakfast yet as he needs help with

feeding.  He slept well overnight.  He reports that he has some mild improvement in

the function of his left upper extremity.  Otherwise, he denies nausea, vomiting,

and diarrhea at this time. 



OBJECTIVE:  VITAL SIGNS:  Temperature 98, pulse 64, respirations 20, oxygen

saturation 97% on room air, blood pressure 127/69. 

GENERAL:  Elderly male, lying in bed with no signs of acute distress. 

PULMONARY:  Equal chest rise and fall.  Clear breath sounds bilaterally.  No signs

of acute respiratory distress. 

CARDIAC:  Regular rate and rhythm.  No murmurs, gallops, or rubs. 

GI:  Abdomen is soft, nontender, nondistended. 

EXTREMITIES:  2+ pulses in all extremities.  No significant swelling noted.  Right

upper and lower extremity with severe paresis which is unchanged from previous.

Left upper extremity with 3 to 4/5 strength, which is mildly improved from

yesterday.  Left lower extremity with BKA from previous accident. 

NEUROLOGIC:  GCS is 15.  Gross motor and sensation have not changed from previous

exam. 



LABORATORY FINDINGS:  White count 10.5, hemoglobin 9.7, hematocrit 28.9, platelets

186.  Sodium 140, potassium 4.3, chloride 110, carbon dioxide 22, BUN 26, creatinine

0.91, glucose 164, phos 4.2, magnesium 1.2. 



DIAGNOSTIC FINDINGS:  There are no new diagnostic findings to report.



ASSESSMENT:  

1. Status post fall from bed.

2. Central cord syndrome with left hand weakness.

3. History of hypertension, cerebrovascular accident with residual right-sided

weakness, hyperlipidemia, diabetes, and left BKA. 



PLAN:  Continue with current pain regimen and diet.  The patient to work with

Physical and Occupational Therapy today.  We have placed a rehab screening as it is

likely he will not be able to return home.  Physical Therapy to assess and make

recommendations for rehab versus a skilled facility.  We will coordinate the

discharge with Case Management at this time.  We will also replace his magnesium.

The patient was seen and examined by Dr. Ventura and myself this morning during

rounds. 







Job ID:  [id number]

## 2019-07-05 NOTE — PRG
DATE OF SERVICE:  



SUBJECTIVE:  The patient is a 57-year-old gentleman with history of CVA,

hypertension,  coming to evaluate of weakness of left hand after fall. His new  
symptoms is unable to make

a fist on the left hand.  The patient was consulted by Neurosurgery, who 
suggested

decompression of cervical spine surgery later this week.  The patient has been 
doing well

and no events overnight.  He is awaiting decompression of his central cord 
syndrome.

 He has no complaint of pain, shortness of breath, or fever.  He is working 
with the

Physical Therapy and he is able to tolerate a regular diet. 



OBJECTIVE:  VITAL SIGNS:  Temperature 98, pulse 71, respiratory rate 18, O2

saturation 99% on room air, blood pressure 100/57. 

GENERAL:  The patient's mental status is at his baseline. He is able to follow

commands.  GCS is 14. 

LUNGS:  Clear bilaterally. 

HEART:  Regular rate and rhythm. 

ABDOMEN:  Soft and nondistended. 

EXTREMITIES:  He has hemiparesis of lower right extremity.  Strength of right 
arm is

1/5; left arm, the patient is unable to make a fist .



ASSESSMENT AND PLAN:  Status post fall, multiple level spinal stenosis with 
central

cord syndrome, history of multiple cerebrovascular accidents.  Plan will be to

continue supportive care and await for neurosurgical intervention. 







Job ID:  [id number]



[facility]

## 2019-07-06 NOTE — PRG
DATE OF SERVICE:  



SUBJECTIVE:  Toy is now two days status post anterior cervical diskectomy and

fusion for cervical spondylitic myelopathy.  He continues to recover from 
surgery

as he has residual deficits from his myelopathy.  Neurosurgical plan will be 
one of

ongoing supportive care and disposition planning towards rehab or skilled 
nursing. 







Job ID:  [id number]



[facility]

## 2019-07-06 NOTE — PDOC.PN
- Subjective


Encounter Start Date: 07/06/19


Encounter Start Time: 11:31





Patient seen and examined, no new issues. 





- Objective


Resuscitation Status - Order Detail:





06/28/19 16:45


Resuscitation Status Routine 


   Resuscitation Status: FULL: Full Resuscitation








Vital Signs & Weight: 


 Vital Signs (12 hours)











  Temp Pulse Pulse Pulse Resp BP BP


 


 07/06/19 09:54    73  79    106/65


 


 07/06/19 09:32       127/68 


 


 07/06/19 08:00  98.5 F  63    20  


 


 07/06/19 04:00  98.6 F  70    18  


 


 07/06/19 00:00  98.5 F  63    19  














  BP BP Pulse Ox


 


 07/06/19 09:54  104/64  


 


 07/06/19 09:32   


 


 07/06/19 08:00   127/68  99


 


 07/06/19 04:00   106/51 L  99


 


 07/06/19 00:00   128/60  98








 Weight











Weight                         152 lb 4 oz














I&O: 


 











 07/05/19 07/06/19 07/07/19





 06:59 06:59 06:59


 


Intake Total 1095 1777 300


 


Output Total   0


 


Balance 1095 1777 300











Result Diagrams: 


 07/05/19 04:47





 07/06/19 05:00


Additional Labs: 


 Accuchecks











  07/06/19 07/06/19 07/05/19





  10:39 05:50 19:33


 


POC Glucose  141 H  124 H  141 H














  07/05/19





  16:53


 


POC Glucose  184 H














Phys Exam





- Physical Examination


Constitutional: NAD


HEENT: PERRLA, moist MMs


Neck: no nodes, no JVD


Respiratory: no wheezing, no rales


Cardiovascular: RRR, no significant murmur, no rub


Gastrointestinal: soft, non-tender, no distention, positive bowel sounds


Musculoskeletal: no edema, pulses present





Dx/Plan


(1) History of CVA (cerebrovascular accident)


Code(s): Z86.73 - PRSNL HX OF TIA (TIA), AND CEREB INFRC W/O RESID DEFICITS   

Status: Acute   





(2) Gait instability


Code(s): R26.81 - UNSTEADINESS ON FEET   Status: Acute   





(3) Hemiplegia affecting right dominant side


Code(s): G81.91 - HEMIPLEGIA, UNSPECIFIED AFFECTING RIGHT DOMINANT SIDE   Status

: Acute   


Qualifiers: 


   Hemiplegia type: spastic   Hemiplegia etiology: late effect of 

cerebrovascular disease   Cerebrovascular disease type: cerebral infarction   

Qualified Code(s): I69.351 - Hemiplegia and hemiparesis following cerebral 

infarction affecting right dominant side   





(4) Seizure


Status: Acute   





(5) DM2 (diabetes mellitus, type 2)


Status: Chronic   





(6) Dyslipidemia


Code(s): E78.5 - HYPERLIPIDEMIA, UNSPECIFIED   Status: Chronic   





(7) HTN (hypertension)


Code(s): I10 - ESSENTIAL (PRIMARY) HYPERTENSION   Status: Chronic   


Qualifiers: 


 





- Plan





* pending placement


* cont current plan of care


* DC once placement arranged

## 2019-07-06 NOTE — PRG
DATE OF SERVICE:  07/06/2019



SUBJECTIVE:  The patient was seen this morning, lying in bed, napping.  He was

easily arousable and reported his pain is well controlled.  Did state that he had a

little bit of difficulty swallowing since his surgery.  He was seen by speech

language pathology, who recommended a modified diet.  Nursing reported adequate p.o.

and liquid intake.  The patient is continued to work with Physical and Occupational

Therapy and has no other complaints at this time. 



PHYSICAL EXAMINATION:

VITAL SIGNS:  Temperature 98.8, pulse 64, respirations 20, oxygen saturation 100% on

room air, and blood pressure 106/61. 

GENERAL:  Middle-aged male, lying in bed with no signs of acute distress. 

PULMONARY:  Equal chest rise and fall.  Clear breath sounds bilaterally.  No signs

of acute respiratory distress. 

CARDIAC:  Regular rate and rhythm.  No murmurs, gallops, or rubs. 

GI:  Abdomen is soft, nontender, and nondistended. 

EXTREMITIES:  2+ pulses in all extremities.  No significant swelling noted.  Right

upper and lower extremity with severe paresis, which is unchanged from previous.

Left upper extremity with 3 to 4/5 strength, which is improved since surgery.  Left

lower extremity with BKA from previous accident. 

NEUROLOGIC:  GCS is 15.  Gross motor and sensation are intact and unchanged from

previous exam. 



LABORATORY FINDINGS:  Sodium 141, potassium 4.2, chloride 111, carbon dioxide 24,

BUN 16, creatinine 0.71, glucose 132, and magnesium 1.8. 



DIAGNOSTIC FINDINGS:  There are no new diagnostic findings to report.



ASSESSMENT:  

1. Status post fall from bed.

2. Central cord syndrome with left hand weakness, improving.

3. History of hypertension, cerebrovascular accident with residual right-sided

weakness, hyperlipidemia, diabetes, and left below-knee amputation. 



PLAN:  Continue the patient's current pain regimen and diet.  We will discontinue

normal saline today as the patient is taking adequate p.o. per nursing.  The patient

is to continue work with physical and occupational therapy.  Speech language

pathology is also evaluating the patient.  The patient has a rehab screen pending.

Trauma Surgery Team will sign off at this time.  If new concerns or questions arise,

please feel free to contact us.  The patient was seen and examined by Dr. Ventura and

myself this morning during rounds. 







Job ID:  [id number]

## 2019-07-07 NOTE — PRG
DATE OF SERVICE:  07/07/2019



Mr. Yeager is resting comfortably in his bed.  He is wearing a cervical spine

collar.  He reports minimal to no pain.  He also reports slight improvement in the

function of his upper extremities. 







Job ID:  [id number]

## 2019-07-07 NOTE — PDOC.PN
- Subjective


Encounter Start Date: 07/07/19


Encounter Start Time: 08:20





Pt seen for followup re: spinal stenosis.  Says he feels better.





- Objective


Resuscitation Status - Order Detail:





06/28/19 16:45


Resuscitation Status Routine 


   Resuscitation Status: FULL: Full Resuscitation








Vital Signs & Weight: 


 Vital Signs (12 hours)











  Temp Pulse Resp BP BP BP Pulse Ox


 


 07/07/19 16:16  99.3 F  67  16   123/74   99


 


 07/07/19 11:30  98.6 F  65  18   125/69   98


 


 07/07/19 08:21     113/69   


 


 07/07/19 07:50        96


 


 07/07/19 07:42  98.3 F  67  20    111/69  96








 Weight











Weight                         152 lb 4 oz














I&O: 


 











 07/06/19 07/07/19 07/08/19





 06:59 06:59 06:59


 


Intake Total [phone]


 


Output Total  0 


 


Balance 8497 891 4051











Result Diagrams: 


 07/05/19 04:47





 07/06/19 05:00


Additional Labs: 


 Accuchecks











  07/07/19 07/07/19 07/07/19





  16:20 11:29 05:33


 


POC Glucose  174 H  140 H  161 H














  07/06/19





  20:31


 


POC Glucose  150 H














Phys Exam





- Physical Examination


Constitutional: NAD


HEENT: moist MMs


Neck: supple


Respiratory: clear to auscultation bilateral


Cardiovascular: RRR


Gastrointestinal: soft


s/p L BKA


R hemiparesis


Psychiatric: normal affect





Dx/Plan


(1) Cord compression syndrome


Code(s): G95.20 - UNSPECIFIED CORD COMPRESSION   Status: Acute   Comment: s/p 

surgery, slowly imptoving   





(2) DM2 (diabetes mellitus, type 2)


Status: Chronic   Comment: reasonable controlled, continue accuchecks and 

insulin sliding scale   





(3) Dyslipidemia


Code(s): E78.5 - HYPERLIPIDEMIA, UNSPECIFIED   Status: Chronic   Comment: 

continue simvastatin   





(4) HTN (hypertension)


Code(s): I10 - ESSENTIAL (PRIMARY) HYPERTENSION   Status: Chronic   


Qualifiers: 


 


Comment: controlled   





- Plan





* .








Review of Systems





- Review of Systems


Cardiovascular: negative: chest pain, palpitations, orthopnea, paroxysmal 

nocturnal dyspnea, edema, light headedness


Gastrointestinal: negative: Nausea, Vomiting, Abdominal Pain, Diarrhea, 

Constipation, Melena, Hematochezia


Neurological: Weakness





- Medications/Allergies


Allergies/Adverse Reactions: 


 Allergies











Allergy/AdvReac Type Severity Reaction Status Date / Time


 


No Known Drug Allergies Allergy   Verified 11/14/14 01:37











Medications: 


 Current Medications





Acetaminophen (Tylenol)  650 mg PO Q4H PRN


   PRN Reason: Headache/Fever/Mild Pain (1-3)


Acetaminophen/Codeine Phosphate (Tylenol #3)  1 tab PO Q3H PRN


   PRN Reason: Mild Pain (1-3)


Acetaminophen/Codeine Phosphate (Tylenol #3)  2 tab PO Q3H PRN


   PRN Reason: Moderate Pain (4-6)


Hydrocodone Bitart/Acetaminophen (Norco 5/325)  1 tab PO Q4H PRN


   PRN Reason: Moderate Pain (4-6)


   Last Admin: 07/07/19 01:52 Dose:  1 tab


Hydrocodone Bitart/Acetaminophen (Norco 5/325)  2 tab PO Q4H PRN


   PRN Reason: Severe Pain (7-10)


   Last Admin: 07/06/19 09:37 Dose:  2 tab


Al Hydroxide/Mg Hydroxide (Maalox)  30 ml PO Q4H PRN


   PRN Reason: Indigestion


Aspirin (Aspirin)  325 mg PO DAILY [facility]


   Last Admin: 07/07/19 08:19 Dose:  325 mg


Bisacodyl (Dulcolax)  10 mg OH Q12H PRN


   PRN Reason: Constipation


   Last Admin: 07/05/19 09:39 Dose:  10 mg


Dexamethasone (Decadron)  4 mg PO 0300,1100,1900 [facility]


   Last Admin: 07/07/19 18:07 Dose:  4 mg


Dextrose/Water (Dextrose 50%)  25 gm IVP PRN PRN


   PRN Reason: HYPOGLYCEMIA PROTOCOL


Diphenhydramine HCl (Benadryl)  25 mg IVP Q6H PRN


   PRN Reason: Itching


Diphenhydramine HCl (Benadryl)  25 mg PO Q6H PRN


   PRN Reason: Itching


Glipizide (Glucotrol Xl)  5 mg PO BID-[facility]


   Last Admin: 07/07/19 16:38 Dose:  5 mg


Glucagon (Glucagon)  1 mg IM PRN PRN


   PRN Reason: HYPOGLYCEMIA PROTOCOL


Dextrose/Water (D5w)  1,000 mls @ 0 mls/hr IV INF PRN


   PRN Reason: HYPOGLYCEMIA PROTOCOL


Insulin Human Lispro (Humalog)  0 units SC .MILD SLIDING SCALE PRN; Protocol


   PRN Reason: MILD SLIDING SCALE


   Last Admin: 07/07/19 18:07 Dose:  2 unit


Levetiracetam (Keppra)  500 mg PO BID [facility]


   Last Admin: 07/07/19 08:19 Dose:  500 mg


Lisinopril (Zestril)  10 mg PO DAILY [facility]


   Last Admin: 07/07/19 08:21 Dose:  10 mg


Magnesium Hydroxide (Milk Of Magnesium)  30 ml PO Q12H PRN


   PRN Reason: Constipation


Metformin HCl (Glucophage)  1,000 mg PO BID-[facility]


   Last Admin: 07/07/19 16:38 Dose:  1,000 mg


Morphine Sulfate (Morphine)  2 mg SLOW IVP Q1H PRN


   PRN Reason: Moderate Breakthrough Pain


Morphine Sulfate (Morphine)  4 mg SLOW IVP Q1H PRN


   PRN Reason: Severe Breakthrough Pain


Ondansetron HCl (Zofran)  4 mg IVP DAILYPRN PRN


   PRN Reason: Nausea


Pantoprazole Sodium (Protonix)  40 mg PO DAILY [facility]


   Last Admin: 07/07/19 08:19 Dose:  40 mg


Pioglitazone HCl (Actos)  30 mg PO DAILY [facility]


   Last Admin: 07/07/19 08:18 Dose:  30 mg


Polyethylene Glycol (Miralax)  17 gm PO DAILY [facility]


   Last Admin: 07/07/19 08:20 Dose:  17 gm


Polyethylene Glycol (Miralax)  17 gm PO DAILYPRN PRN


   PRN Reason: Constipation


Promethazine HCl (Phenergan)  12.5 mg PO Q4H PRN


   PRN Reason: Nausea/Vomiting


Promethazine HCl (Phenergan)  12.5 mg IM Q4H PRN


   PRN Reason: Nausea/Vomiting


Senna/Docusate Sodium (Senokot S)  1 tab PO BID [facility]


   Last Admin: 07/07/19 08:19 Dose:  1 tab


Simvastatin (Zocor)  10 mg PO HS [facility]


   Last Admin: 07/06/19 19:51 Dose:  10 mg


Sodium Chloride (Flush - Normal Saline)  10 ml IVF PRN PRN


   PRN Reason: Saline Flush


Sodium Chloride (Flush - Normal Saline)  10 ml IVF PRN PRN


   PRN Reason: Saline Flush


Throat Lozenges (Cepastat Lozenges)  1 donnell PO PRN PRN


   PRN Reason: Sore Throat


   Last Admin: 07/05/19 09:31 Dose:  1 donnell


Tizanidine HCl (Zanaflex)  4 mg PO Q6H PRN


   PRN Reason: Muscle Spasm

## 2019-07-08 NOTE — PRG
DATE OF SERVICE:  07/08/2019



I saw Mr. Yeager in his hospital room this morning.  He has been moved from the

Stroke Unit to the surgical floor.  Mr. Yeager was ready to go to inpatient

rehabilitation on Friday, Saturday, and Sunday.  He would like to get his rehab

underway.  Mr. Yeager' neurological examination is stable and the incision seems to

be healing.  He does not need his collar when he is sleeping, but when he is up and

around, I would like him to have it on.  He can be transferred to rehab anytime. 







Job ID:  [id number]

## 2019-07-09 NOTE — DIS
DATE OF ADMISSION:  06/28/2019



DATE OF DISCHARGE:  07/08/2019



DISCHARGE DIAGNOSES:  

1. Central cord compression, status post anterior cervical diskectomy with

intervertebral arthrodesis and anterior cervical plating at C3 on C4 and C4 on C5,

07/04/2019. 

2. Status post mechanical fall.

3. Status post lacerations of the right forehead with primary closure.

4. History of right hemiplegia, status post cerebrovascular accident,

wheelchair-bound. 

5. Diabetes mellitus type 2.

6. Hypertension.



CONSULTATIONS:  

1. Dr. Rosa with Neurology Service.

2. Dr. Ventura with Trauma Service.

3. Dr. Toussaint with Neurosurgical Service.



PERTINENT LAB AND X-RAY FINDINGS:  Complete metabolic profile within normal limits.

CBC showed a hemoglobin ranged between 9.7 to 12.0.  Urine drug screen dated

06/28/2019, negative.  CT of the brain without contrast dated 06/28/2019, showed no

acute intracranial process.  CT angiogram of the [place] dated 06/28/2019

showed no acute intracranial process.  CT of the cervical spine dated 06/28/2019

showed prominent degenerative changes throughout the cervical spine.  No acute

process identified.  MRI of the brain dated 06/29/2019 showed no acute infarct.

Chronic small vessel ischemic changes noted.  Cervical spine MRI dated 06/29/2019

showed central cervical cord compression at C5 on C6 with edema.  No evidence of

malignant process.  Thoracic spine MRI dated 06/29/2019 showed no evidence of

thoracic spine fracture.  No evidence of high-grade central canal stenosis or

high-grade foraminal narrowing. 



HOSPITAL COURSE:  The patient was initially admitted after presenting status post

mechanical fall with left-sided weakness.  The patient with a previous history of

right-sided hemiplegia in the context of previous CVA involving the right frontal

and parietal region.  The patient underwent extensive evaluation including multiple

neuro imaging studies showing no evidence of acute central CVA.  Workup did reveal

central cord compression in the cervical spine, likely contributing the patient's

left-sided weakness and fall.  The patient was initially evaluated by the Trauma

Service and placed in a cervical collar and screened for potential CVA.  Due to the

patient's distal cord compression of cervical spine, patient underwent evaluation by

the neurosurgical team, undergoing operative repair on 07/04/2019 for cord

compression and severe myelopathy with anterior cervical diskectomy and

intervertebral arthrodesis in the C3 on C4 and C4 on C5 region.  The patient was

managed postoperatively by the Neurosurgical team as well as pain control with oral

and IV options to include narcotic therapy.  The patient was also placed on

dexamethasone and overall clinically stabilized with supportive management.  Due to

the patient's general comorbid status, recent fall, and surgical intervention,

patient was deemed an appropriate candidate for ongoing skilled nursing care.  The

patient was also assessed for questionable dysphagia with recommendations for a

modified diet with pureed texture and nectar thick liquids.  The patient was also

modified on his medication regimen to include crush medications and a diet with

aspiration risk.  The patient did not want to pursue any further modification to his

dietary regimen or consider a feeding tube.  I have examined the patient at the time

of discharge and discussed followup instructions.  The patient overall clinically

stable and ready for discharge on 07/08/2019. 



DISCHARGE MEDICATIONS:  

1. Tylenol No.3 1 to 2 tabs p.o. q.6 hours p.r.n. pain.

2. Dexamethasone 4 mg p.o. daily x5 days.

3. Glipizide XL 5 mg p.o. b.i.d.

4. Keppra 500 mg p.o. b.i.d.

5. Lisinopril 10 mg p.o. daily.

6. Metformin 1000 mg p.o. b.i.d.

7. Actos 30 mg p.o. daily.

8. Simvastatin 10 mg p.o. nightly.

9. Tizanidine 4 mg one tablet p.o. t.i.d. p.r.n.



FOLLOWUP:  The patient may follow up with Dr. Toussaint and to call his office for

appointment time and date. 



CONDITION ON DISCHARGE:  Fair.



ACTIVITY:  Ad-krystin, wheelchair-bound status with fall risk precautions.  Left

below-the-knee amputation.  Aspen collar while sitting upright or mobilizing in a

wheelchair. 



DIET:  ADA with nectar-thick liquids and pureed texture.  Crush medications.  Diet

with aspiration risk. 



SPECIAL INSTRUCTIONS:  I will recommend ongoing physical, occupational, and speech

therapy. 



CODE STATUS:  Full.



DISPOSITION:  Discharge to [facility], 07/08/2019.



TIME SPENT:  Total time preparing and coordinating discharge 36 minutes.







Job ID:  [id number]

## 2019-07-15 NOTE — CT
CT NECK SOFT TISSUES WITH CONTRAST:

 

HISTORY:

Postoperative.  Difficulty swallowing.

 

COMPARISON:

MRI cervical spine from 06/29/2019.

 

FINDINGS:

There is new ACDF hardware at C3-C5 without evidence for hardware complication.  No migration of the 
diskectomy cages.  Old left medial lamina papyracea dehiscence.

 

There is what appears to be a small right superficial neck seroma, containing a small focus of gas, a
nterior and medial to the sternocleidomastoid muscle, measuring 1.3 x 1.4 x 1.6 cm.

 

There appears to be a small postoperative seroma anterior to the hardware, measuring 1.8 cm x 6 mm in
 AP dimension x craniocaudal length of 2.8 cm.

 

There is abnormal consolidation within the right upper lobe.  This is incompletely evaluated on this 
examination.

 

No definite abscess formation.

 

IMPRESSION:

1.  Likely small postoperative seroma along the right neck, at the superficial fascia, just anterior 
and medial to the sternocleidomastoid muscle, as well as just anterior to the anterior cervical diske
ctomy and fusion hardware.  No definite evidence for abscess collection.

 

2.  Abnormal consolidation within the right upper lobe.  This may reflect infection or aspiration his
tory.  Mass is felt less likely.  Follow-up CT of the chest in two weeks is recommended for further e
valuation of this consolidation, as it is incompletely seen on today's examination.

 

3.  If there is concern for acute cord compression due to infection, MRI would be recommended.

 

POS: HOME

## 2019-07-15 NOTE — RAD
PORTABLE CHEST ONE VIEW:

 

07/15/2019

 

3:35 p.m.

 

HISTORY:

Cough.

 

COMPARISON:

11/14/2014

 

FINDINGS:

The heart size is normal.  The lungs are well expanded without lobar consolidation, pneumothoraces, o
r pleural effusions.  There is a plate of linear atelectasis in the left mid lung.

 

POS: OFF

## 2019-07-15 NOTE — HP
REASON FOR ADMISSION:  Severe dehydration, recent anterior cervical spine surgery

with dysphagia now, severe deconditioning. 



HISTORY OF PRESENTING ILLNESS:  The patient has a history of anterior cervical

diskectomy done for cord compression after he fell out of bed on .  The

patient was later discharged to [facility] for further recuperation.

From the last 5 to 6 days, the patient has not been able to eat or drink.  The

patient says it is the same for both solids and liquids.  He is also choking on

anything that is given to him.  He feels like his throat is swollen.  Had a fever of

99 on arrival here.  Sister who is here at his bedside is wanting a feeding tube to

be put in him.  The patient was eating normally prior to his last hospitalization

here.  This was confirmed with the patient.  He was also ambulating with his

prosthesis per the patient before the surgery.  He has not ambulated now after

surgery so far.  Has cough with expectoration.  No complaints of chest pain or

palpitation.  The patient feels very weak. 



PAST MEDICAL AND SURGICAL HISTORY:  History of CVA with right hemiplegia 2 years

back; diabetes mellitus type 2; right BKA due to forklift accident in the past;

history of neck abscess, status post drainage in the past; hypertension;

dyslipidemia.  Had anterior cervical diskectomy with intervertebral arthrodesis and

plating done for C3-4, C4-5 by Dr. Toussaint on 2019. 



CURRENT MEDICATIONS:  The patient was discharged on:

1. Glipizide extended release 5 mg twice daily.

2. Keppra 500 mg p.o. twice daily.

3. Lisinopril 10 mg daily.

4. Pioglitazone 30 mg p.o. daily.

5. Metformin 1000 mg twice daily.

6. Simvastatin 10 mg p.o. q.h.s.

7. Tizanidine 4 mg p.o. q.8 hourly p.r.n.

8. Tylenol No. 3 two tablets p.o. q.6 hourly p.r.n.



ALLERGIES:  NO KNOWN DRUG ALLERGIES.



PERSONAL HISTORY:  He is currently living at [facility].  Does not

abuse alcohol or drugs.  No history of smoking. 



FAMILY HISTORY:  Mother is living.  She is on dialysis, is legally blind and has

diabetes as well.  Father  at the age of 87 years from natural causes. 



CODE STATUS:  Full.  Power of  is his sister, Ms. Moctezuma.  He also has a

daughter, whose name is Jhoana. 



REVIEW OF SYSTEMS:  CONSTITUTIONAL:  Negative for weight loss or gain, ability to

conduct usual activities. 

SKIN:  Negative for rash, itching. 

EYES:  Negative for double vision, pain. 

ENT/MOUTH:  Negative for nose bleeding, neck stiffness, pain, tenderness. 

CARDIOVASCULAR:  Negative for palpitations, dyspnea on exertion, orthopnea. 

RESPIRATORY:  Negative for shortness of breath, wheezing, cough, hemoptysis, fever

or night sweats. 

GASTROINTESTINAL:  Negative for poor appetite, abdominal pain, heartburn, nausea,

vomiting, constipation, or diarrhea. 

GENITOURINARY:  Negative for urgency, frequency, dysuria, nocturia. 

MUSCULOSKELETAL:  Negative for pain, swelling. 

NEUROLOGIC/PSYCHIATRIC:  Negative for anxiety, depression. 

ALLERGY/IMMUNOLOGIC:  Negative for skin rash, bleeding tendency.



PHYSICAL EXAMINATION:

GENERAL:  The patient is a 57-year-old male, who is currently not in any acute

distress. 

VITAL SIGNS:  Blood pressure 106/70, pulse 96 per minute, respiratory rate 20 per

minute, temperature 99.2 degrees Fahrenheit, saturating 95% on room air. 

NECK:  Supple.  No elevated JVD. 

HEENT:  Eyes; extraocular muscles intact.  Pupils reacting to light.  Oral cavity;

mucous membranes are dry.  No exudates or congestion. 

CARDIOVASCULAR SYSTEM:  S1 and S2 heard.  Regular rhythm. 

RESPIRATORY SYSTEM:  Scattered rhonchi plus mild wheezes plus bilateral. 

ABDOMEN:  Soft.  Bowel sounds heard.  No tenderness, rigidity, or guarding. 

EXTREMITIES:  No peripheral edema or calf tenderness.  Peripheral pulses 1+

bilateral. 

SKIN:  No ischemic ulcerations or gangrene. 

CENTRAL NERVOUS SYSTEM:  The patient has right hemiplegia, which is chronic.  He

also has a strength of 3/5 in his left upper extremity.  He freely moves left lower

extremity. 

PSYCHIATRIC SYSTEM:  No obvious hallucinations or delusions.



LABORATORY DATA:  White count of 12, H and H of 12 and 38, platelet count 218, MCV

is 89 with 87% neutrophils.  Sodium 152, chloride 115, serum bicarb 27, BUN 40,

creatinine 1.0, serum glucose 188, calcium is 10.5.  Liver enzymes within normal

limits.  Albumin is 4. 



IMAGING STUDIES:  Chest x-ray shows no acute abnormality.  CT neck with contrast

done shows small postoperative seroma along the right neck at the superficial

fascia, just anterior and medial to sternocleidomastoid as well as just anterior to

the anterior cervical diskectomy and fusion hardware.  No definite evidence for

abscess collection.  There was abnormal consolidation within the right upper lobe.

This may reflect infection or aspiration. 



CLINICAL IMPRESSION AND PLAN:  The patient will be admitted to medical floor for

severe dysphagia with recent anterior cervical diskectomy and likely aspiration

pneumonia.  He will be placed on Solu-Medrol 40 mg IV q.6 hourly, clindamycin 600 mg

IV q.8 hourly, and aggressive hydration with D5 water at 100 mL/h.  We will closely

monitor his fingerstick glucose in view of him being a diabetic and the D5 water

that has been given.  He has severe dehydration as well clinically.  If needed, he

will be placed on insulins.  We will continue his Keppra as before.  He will be on

DuoNeb q.6 hourly.  We will consult Dr. Toussaint for Neurosurgery.  We will also

consult Speech therapist.  Likely with steroids, his dysphagia should improve.  If

the patient were not to improve, then further measures will be undertaken to help

with his nutrition.  I have discussed all of this with the patient and sister, Ms. Moctezuma, who is wanting a feeding tube initially.  She agrees to current plan for now.

 A blood culture stat has been obtained in the ER.  We will follow up on that.

PT/OT evaluations will be requested.  The patient has his prosthesis at his bedside.

 He also has weakness in his left side, specifically the left upper extremity as

well at present.  Code status was discussed with the patient and his power of

 and both agreed for him to be full code. 







Job ID:  [id number]

## 2019-07-16 NOTE — CON
DATE OF CONSULTATION:  



HISTORY OF PRESENT ILLNESS:  Mr. Yeager is a 57-year-old male, who is known to 
our

Neurosurgery Group.  He was in the hospital the beginning of the month 
following a

fall out of bed when it was safe to do so and he was off anticoagulation.  I

performed anterior cervical diskectomy and fusion on July 4th for his reported

injury.  The patient progressed well while in the hospital stay for several days

before he was able to discharge to rehab and was not having any dysphagia at 
that

time.  Today, I see Mr. Yeager in his hospital room.  He has been admitted for

dehydration, dysphagia, and possible pneumonia.  He has a congestion, thick 
mucousy

cough.  His incision is closed.  There is a little bit of swelling noted.  
There is

no discharge or erythema.  He does complain of little soreness and unable to 
swallow

solids or liquids for 5 days.  Otherwise, the patient is neurologically intact.
  He

is alert and oriented.  He is moving his left upper extremity with good 
strength and

actually increase in  strength than prior to surgery.  He is moving the left

lower extremity, which has a below-the-knee amputation, but moving a good 
strength

in his right.  He is able to wiggle his toes and move around slightly with right

upper extremity contracture. 



REVIEW OF SYSTEMS:  A 10-point review of systems has been completed and is 
negative

other than stated in the above HPI. 



PAST MEDICAL HISTORY:  Hyperlipidemia, neurologic disease, ischemic 
cerebrovascular

accident, multiple diabetes type 2, and hypertension. 



PAST SURGICAL HISTORY:  Left lower extremity amputation below the knee due to

forklift accident, anterior cervical diskectomy for cord compression. 



SOCIAL HISTORY:  The patient is a former drug user.  Denies alcohol or tobacco.



ALLERGIES:  NO KNOWN DRUG ALLERGIES.



MEDICATIONS:  

1. Simvastatin.

2. Metformin.

3. Lisinopril.

4. Glipizide.

5. Pioglitazone.

6. Atorvastatin.

7. Dexamethasone.

8. __________.



PHYSICAL EXAMINATION:

VITAL SIGNS:  Temperature 97.6, heart rate 78, respirations 18, O2 saturations 
96%

on room air, and blood pressure 155/79. 

CONSTITUTIONAL:  The patient is hypertensive, afebrile, appears nontoxic and

pain-free.  He is alert and oriented. 

HEENT:  Head is normocephalic and atraumatic.  Pupils are equal, round, and 
reactive

to light.  Extraocular movements are intact.  Hearing is intact.  Moist mucous

membranes and thick mucous seen in his mouth. 

NECK:  There is an incision right of midline, which is healing well.  This is a

well-aligned incision.  There is no significant erythema.  There is no drainage.

There is minimal amount of swelling under the incision.  It is nontender. 

EXTREMITIES:  The patient is moving left upper extremity with good strength in

deltoids, biceps, triceps.   strength has shown improvement.  Right upper

extremity, normal right hemiplegia with contractures.  Lower extremities, left 
lower

extremity, below-the-knee amputation.  Good strength and range of motion.  Right

lower extremity, decreased range of motion.  He toe wiggle for me. 

NEURO:  The patient is alert and oriented x3.  Speech is spontaneous, although

mumbled due to some mucous in his mouth.  There are no sensory or motor deficits

noted. 



LABORATORY DATA:  WBCs when he came in last night 12.8, this morning 10.6.

IMAGING:

CT: No significant fluid collection surgical site, screws and plate in place



ASSESSMENT AND PLAN:  Mr. Yeager has been brought in to the hospital for 
dysphagia,

possible aspiration pneumonia.  From neurosurgical standpoint, recommend 
starting

steroids and treating possible infection.  There is no drainage from the 
incision. There is no significant fluid collection.

It is unlikely that we need to perform another surgery. 







Job ID:  [id number]



[facility]

## 2019-07-16 NOTE — PRG
DATE OF SERVICE:  07/16/2019



I personally saw Mr. Yeager in his hospital room this morning.  I reviewed 
notes and

imaging, and I agreed with previous documentation. 



Briefly, Mr. Yeager is a 57-year-old gentleman with severe cervical spondylotic

myelopathy, and we were consulted during his last hospitalization and performed 
the

ACDF at C3-4, and C4-5 for decompression of a severely compressed spinal cord.  
He

already had T2 signal change and myelomalacia within the substance of the cord.
  We

operated to prevent further neurological deterioration. 



He was transferred after his hospitalization to nursing home and over the last 
4 to

5 days, he has had progressive difficulty swallowing, for which he was brought 
back

to the emergency department.  This morning, I am seeing him, and I do not see 
any

recorded fevers, although the white count is a bit up.  Blood pressure is 155 
this

morning.  Other vitals looked stable.  The voice is a bit hoarse voice

there are some upper airway sounds suggestive of slight aspiration.  He clears 
with

coughing.  I do not find any new neurological deficit.  CT examination of 
cervical

spine did not reveal large fluid collection in the operative field.  Plate and

screws are in position. 



The operative intervention of Mr. Yeager was relatively unremarkable.  I did not

have any specific concern about the esophagus, although the high anterior 
cervical

dissection is sometimes complicated by postoperative swelling and dysphagia.  I

started Decadron with a 10 mg bolus and then 4 mg q.i.d. for the next few days

before we start to taper it.  I switched the Pepcid to Protonix twice a day.  We

will have to watch for any lung consolidation developing over the next few days 
as

early aspiration pneumonitis is a possibility.  I do not see a strong 
indication to

reoperate as there is very little mass effect on the upper airway and upper

esophagus.  We will continue to follow while he is in the hospital. 







Job ID:  [id number]



[facility]

## 2019-07-16 NOTE — PDOC.PN
- Subjective


Encounter Start Date: 07/16/19


Encounter Start Time: 08:20


Subjective: is awake, no difficulty breathing


-: has not tried eating/drinking


-: daughter at bedside





- Objective


Resuscitation Status - Order Detail:





07/15/19 19:19


Resuscitation Status Routine 


   Resuscitation Status: FULL: Full Resuscitation


   Discussed with: POA: sister Ms.Anita PHILIP Reviewed: Yes


Vital Signs & Weight: 


 Vital Signs (12 hours)











  Temp Pulse Resp BP Pulse Ox


 


 07/16/19 08:10      95


 


 07/16/19 07:21  98.5 F  95  20  129/77  95


 


 07/16/19 06:42   70  12   100


 


 07/16/19 04:00  97.6 F  78  18  155/79 H  96


 


 07/16/19 00:00  97.4 F L  93  18  116/73  95








 Weight











Admit Weight                   134 lb 7.712 oz


 


Weight                         134 lb 7.712 oz














I&O: 


 











 07/15/19 07/16/19 07/17/19





 06:59 06:59 06:59


 


Intake Total  757 10


 


Balance  757 10











Result Diagrams: 


 07/16/19 04:54





 07/16/19 04:54


Additional Labs: 


 Accuchecks











  07/16/19 07/15/19





  03:58 20:28


 


POC Glucose  278 H  161 H














Phys Exam





- Physical Examination


HEENT: PERRLA, sclera anicteric


Neck: no JVD


Respiratory: no wheezing


rhonchi+


Cardiovascular: RRR, no significant murmur


Gastrointestinal: soft, no distention, positive bowel sounds


Musculoskeletal: no edema, pulses present


right bka


right hemiplegia, dysphagia, moves left extremities but has strength of 3/5


Psychiatric: A&O x 3





Dx/Plan


(1) Aspiration pneumonia


Code(s): J69.0 - PNEUMONITIS DUE TO INHALATION OF FOOD AND VOMIT   Status: 

Acute   


Qualifiers: 


   Laterality: right   Lung location: upper lobe of lung 





(2) Dysphagia


Code(s): R13.10 - DYSPHAGIA, UNSPECIFIED   Status: Acute   


Qualifiers: 


   Dysphagia type: unspecified   Qualified Code(s): R13.10 - Dysphagia, 

unspecified   





(3) Severe dehydration


Code(s): E86.0 - DEHYDRATION   Status: Acute   Comment: with electrolyte 

abnormalities, improving   





(4) Cord compression syndrome


Code(s): G95.20 - UNSPECIFIED CORD COMPRESSION   Status: Acute   Comment: had 

ant cervical approach decompression 7/4/2019, slowly improving   





(5) History of CVA (cerebrovascular accident)


Code(s): Z86.73 - PRSNL HX OF TIA (TIA), AND CEREB INFRC W/O RESID DEFICITS   

Status: Chronic   Comment: with right hemiplegia   





(6) Seizure


Status: Chronic   





(7) DM2 (diabetes mellitus, type 2)


Status: Chronic   


Qualifiers: 


   Diabetes mellitus long term insulin use: without long term use 





(8) Dyslipidemia


Code(s): E78.5 - HYPERLIPIDEMIA, UNSPECIFIED   Status: Chronic   Comment: 

continue simvastatin   





(9) HTN (hypertension)


Code(s): I10 - ESSENTIAL (PRIMARY) HYPERTENSION   Status: Chronic   


Qualifiers: 


   Hypertension type: essential hypertension 


Comment: controlled   





(10) Physical deconditioning


Code(s): R53.81 - OTHER MALAISE   Status: Acute   





- Plan


hemostable


-: is on decadron, protonix, nebs, clindamycin, i.spirometry


-: encourage po intake start with fluids and adv as tolerated


-: continue 1/2 NS, keppra, senna


-: PT/OT to mobilize as tolerated





* .


No modified ba swallow needed now.





Review of Systems





- Medications/Allergies


Allergies/Adverse Reactions: 


 Allergies











Allergy/AdvReac Type Severity Reaction Status Date / Time


 


No Known Drug Allergies Allergy   Verified 07/15/19 20:29











Medications: 


 Current Medications





Acetaminophen (Tylenol)  650 mg PO Q4H PRN


   PRN Reason: Headache/Fever/Mild Pain (1-3)


Acetaminophen (Tylenol)  650 mg PA Q4H PRN


   PRN Reason: Headache/Fever/Mild Pain (1-3)


Albuterol/Ipratropium (Duoneb)  3 ml NEB [id number] [facility]


   Last Admin: 07/16/19 06:42 Dose:  3 ml


Bisacodyl (Dulcolax)  10 mg PA DAILYPRN PRN


   PRN Reason: Constipation


Dexamethasone (Decadron)  4 mg SLOW IVP QID [facility]


   Last Admin: 07/16/19 08:22 Dose:  Not Given


Dextrose/Water (Dextrose 50%)  25 gm SLOW IVP PRN PRN


   PRN Reason: Hypoglycemia


Enoxaparin Sodium (Lovenox)  40 mg SC 0900 [facility]


   Last Admin: 07/16/19 08:24 Dose:  40 mg


Glucagon (Glucagon)  1 mg IM PRN PRN


   PRN Reason: Hypoglycemia


Guaifenesin/Dextromethorphan (Robitussin Dm)  15 ml PO Q4H PRN


   PRN Reason: Cough


Clindamycin Phosphate/Dextrose (600 mg/ Device)  50 mls @ 100 mls/hr IVPB Q8HR 

[facility]


   Last Admin: 07/16/19 05:17 Dose:  50 mls


Dextrose/Water (D5w)  1,000 mls @ 0 mls/hr IV .Q0M PRN


   PRN Reason: Hypoglycemia


Sodium Chloride (1/2 Normal Saline)  1,000 mls @ 100 mls/hr IV .Q10H [facility]


   Last Admin: 07/16/19 08:19 Dose:  1,000 mls


Insulin Human Lispro (Humalog)  0 units SC .MODERATE SLIDING SC PRN


   PRN Reason: Moderate Correctional Scale


   Last Admin: 07/16/19 06:11 Dose:  6 unit


Levetiracetam (Keppra)  500 mg PO BID [facility]


   Last Admin: 07/16/19 08:26 Dose:  Not Given


Ondansetron HCl (Zofran)  4 mg IVP Q6H PRN


   PRN Reason: Nausea/Vomiting


Pantoprazole Sodium (Protonix)  40 mg IVP Q12HR [facility]


   Last Admin: 07/16/19 08:23 Dose:  40 mg


Senna/Docusate Sodium (Senokot S)  2 tab PO BID PRN


   PRN Reason: Constipation


Sodium Chloride (Normal Saline Pf)  10 ml FS PRN PRN


   PRN Reason: RECONSTITUTION

## 2019-07-17 NOTE — PRG
DATE OF SERVICE:  



I saw Mr. Yeager in his hospital room this morning.  He is at the beginning of his

third hospital day.  This is an admission on the 15th for dysphagia and possible

aspiration pneumonitis 2 weeks after an ACDF.  Decadron was started yesterday.  No

reports of events overnight.  He has been afebrile.  Currently, Mr. Yeager is

resting comfortably.  His neurological examination is same as it has been since I

have met him.  There is flexion contracture of the right elbow.  Right arm is

relatively useless.  The left arm has good motion except for the hand intrinsics,

which are weak.  This is stable from prior examinations.  Anterior neck incision

does not look distended at all.  There does not appear to be any fluid collection

superficially or beneath it and there is no drainage. 



There is no new blood count. 



The CT examination of the soft tissues of the neck done on admission showed a small

consolidation in the right upper lobe of the lung, probably from small aspiration

events. 



Mr. Yeager tells me this morning that he got some ice chips done yesterday.  This is

an improvement from where he was at the facility caring for him.  If the steroid

medication continues to result in significant improvement and he gets back to taking

more solid foods in a few days, then I think we would have avoided any repeat

operations or need for them.  Our Neurosurgery Team will continue to follow along

while he is in the hospital. 







Job ID:  [id number]

## 2019-07-17 NOTE — PDOC.PN
- Subjective


Encounter Start Date: 07/17/19


Encounter Start Time: 11:35


Subjective: awake, says he just had some ice chips


-: has not had any other food so far


-: moves his left extremities





- Objective


Resuscitation Status - Order Detail:





07/15/19 19:19


Resuscitation Status Routine 


   Resuscitation Status: FULL: Full Resuscitation


   Discussed with: POA: sister Ms.Anita PHILIP Reviewed: Yes


Vital Signs & Weight: 


 Vital Signs (12 hours)











  Temp Pulse Resp BP Pulse Ox


 


 07/17/19 11:41  98.1 F  60  16  166/82 H  96


 


 07/17/19 08:45      97


 


 07/17/19 08:00  97.6 F  77  16  131/79  97


 


 07/17/19 06:44      99


 


 07/17/19 06:41   79  18   100


 


 07/17/19 01:48      98


 


 07/17/19 00:30   63  19   96


 


 07/17/19 00:21  97.7 F  52 L  16  154/57 H  100








 Weight











Admit Weight                   134 lb 7.712 oz


 


Weight                         134 lb 7.712 oz














I&O: 


 











 07/16/19 07/17/19 07/18/19





 06:59 06:59 06:59


 


Intake Total 757 2379 


 


Balance 757 2379 











Result Diagrams: 


 07/16/19 04:54





 07/17/19 05:43


Additional Labs: 


 Accuchecks











  07/17/19 07/16/19 07/16/19





  05:29 20:01 17:34


 


POC Glucose  261 H  200 H  235 H














Phys Exam





- Physical Examination


HEENT: PERRLA, moist MMs


Neck: no JVD, supple


Respiratory: no wheezing, no rales


Cardiovascular: RRR, no significant murmur


Gastrointestinal: soft, no distention, positive bowel sounds


Musculoskeletal: no edema, pulses present


right UE flexion contracture


right hemiplegia, dysphasia


responds well to verbal stimuli





Dx/Plan


(1) Aspiration pneumonia


Code(s): J69.0 - PNEUMONITIS DUE TO INHALATION OF FOOD AND VOMIT   Status: 

Acute   


Qualifiers: 


   Laterality: right   Lung location: upper lobe of lung 





(2) Dysphagia


Code(s): R13.10 - DYSPHAGIA, UNSPECIFIED   Status: Acute   


Qualifiers: 


   Dysphagia type: unspecified   Qualified Code(s): R13.10 - Dysphagia, 

unspecified   





(3) Severe dehydration


Code(s): E86.0 - DEHYDRATION   Status: Acute   Comment: with electrolyte 

abnormalities, improving   





(4) Cord compression syndrome


Code(s): G95.20 - UNSPECIFIED CORD COMPRESSION   Status: Chronic   Comment: had 

ant cervical approach decompression 7/4/2019, slowly improving   





(5) History of CVA (cerebrovascular accident)


Code(s): Z86.73 - PRSNL HX OF TIA (TIA), AND CEREB INFRC W/O RESID DEFICITS   

Status: Chronic   Comment: with right hemiplegia   





(6) Seizure


Status: Chronic   





(7) DM2 (diabetes mellitus, type 2)


Status: Chronic   


Qualifiers: 


   Diabetes mellitus long term insulin use: without long term use 





(8) Dyslipidemia


Code(s): E78.5 - HYPERLIPIDEMIA, UNSPECIFIED   Status: Chronic   Comment: 

continue simvastatin   





(9) HTN (hypertension)


Code(s): I10 - ESSENTIAL (PRIMARY) HYPERTENSION   Status: Chronic   


Qualifiers: 


   Hypertension type: essential hypertension 


Comment: controlled   





(10) Physical deconditioning


Code(s): R53.81 - OTHER MALAISE   Status: Acute   





- Plan


hemostable


-: nsx opinion reg peg tube until he can take orally


-: has coughing spells upon fluid intake multiple times yesterday per staff


-: keppra iv , decadron iv, protonix iv


-: continue iv fluids at current rated x 18hrs





* .


PT/OT to mobilize as tolerated.





Review of Systems





- Medications/Allergies


Allergies/Adverse Reactions: 


 Allergies











Allergy/AdvReac Type Severity Reaction Status Date / Time


 


No Known Drug Allergies Allergy   Verified 07/15/19 20:29











Medications: 


 Current Medications





Acetaminophen (Tylenol)  650 mg PO Q4H PRN


   PRN Reason: Headache/Fever/Mild Pain (1-3)


Acetaminophen (Tylenol)  650 mg MO Q4H PRN


   PRN Reason: Headache/Fever/Mild Pain (1-3)


Albuterol/Ipratropium (Duoneb)  3 ml NEB [id number] [facility]


   Last Admin: 07/17/19 06:41 Dose:  3 ml


Bisacodyl (Dulcolax)  10 mg MO DAILYPRN PRN


   PRN Reason: Constipation


Dexamethasone (Decadron)  4 mg SLOW IVP QID [facility]


   Last Admin: 07/17/19 08:47 Dose:  4 mg


Dextrose/Water (Dextrose 50%)  25 gm SLOW IVP PRN PRN


   PRN Reason: Hypoglycemia


Enoxaparin Sodium (Lovenox)  40 mg SC 0900 [facility]


   Last Admin: 07/17/19 08:46 Dose:  40 mg


Glucagon (Glucagon)  1 mg IM PRN PRN


   PRN Reason: Hypoglycemia


Guaifenesin/Dextromethorphan (Robitussin Dm)  15 ml PO Q4H PRN


   PRN Reason: Cough


Clindamycin Phosphate/Dextrose (600 mg/ Device)  50 mls @ 100 mls/hr IVPB Q8HR 

[facility]


   Last Admin: 07/17/19 05:31 Dose:  50 mls


Dextrose/Water (D5w)  1,000 mls @ 0 mls/hr IV .Q0M PRN


   PRN Reason: Hypoglycemia


Sodium Chloride (1/2 Normal Saline)  1,000 mls @ 100 mls/hr IV .Q10H [facility]


   Last Admin: 07/17/19 05:31 Dose:  1,000 mls


Levetiracetam 500 mg/ Device  100 mls @ 200 mls/hr IVPB BID [facility]


   Last Admin: 07/17/19 08:47 Dose:  100 mls


Insulin Glargine 10 units/ (Miscellaneous Medication)  0.1 mls @ 0 mls/hr SC 

BID [facility]


Insulin Human Lispro (Humalog)  0 units SC .MODERATE SLIDING SC PRN


   PRN Reason: Moderate Correctional Scale


   Last Admin: 07/17/19 05:32 Dose:  6 unit


Ondansetron HCl (Zofran)  4 mg IVP Q6H PRN


   PRN Reason: Nausea/Vomiting


Pantoprazole Sodium (Protonix)  40 mg IVP Q12HR [facility]


   Last Admin: 07/17/19 08:46 Dose:  40 mg


Senna/Docusate Sodium (Senokot S)  2 tab PO BID PRN


   PRN Reason: Constipation


Sodium Chloride (Normal Saline Pf)  10 ml FS PRN PRN


   PRN Reason: RECONSTITUTION

## 2019-07-18 NOTE — PQF
CLINICAL DOCUMENTATION IMPROVEMENT CLARIFICATION FORM:  ICD-10 Updated

PLEASE DO AN ADDENDUM TO THE PROGRESS NOTE WITH ANY DOCUMENTATION UPDATES OR 
ADDITIONS AND CARRY THROUGH TO DC SUMMARY.   THANK YOU.



DATE:  7/18/2019                                 ATTN: Dr. Irvin



Please exercise your independent, professional judgment in responding to the 
clarification form. 

Clinical indicators are provided on the bottom of this form for your review



Please check appropriate box(s):



[x  ]  I (concur)  with the Nursing Assessment  findings as stated below.



[  ] Pressure Ulcer: (Stage I: Erythema; Stage II: Partial thickness; Stage III
: Full thickness; Stage IV: Necrosis to muscle/bone)

        [  ] Location: ______________________________

                 Stage (I to IV): __(Left__Right__Bilateral__N/A__)

        [  ] Location: __________________________________

                Stage (I to IV): __(Left__Right__Bilateral__N/A__)

[  ] No pressure ulcer diagnosis

[  ] Other diagnosis ___________

[  ] Unable to determine



In addition, please specify:

Present on Admission (POA):  [  x] Yes             [  ] No             [  ] 
Unable to determine



For continuity of documentation, please document condition throughout progress 
notes and discharge summary.  Thank You.



CLINICAL INDICATORS - SIGNS / SYMPTOMS / LABS



Nursing assessment 7/15 @ 2010:   Sacrum Pressure Ulcer. Stage II



RISKS:

H&P 7/15: Hx of CVA with r hemiplegia 2 yrs back; DM 2: r BKA. 

Admitted for severe dysphagia wit recent anterior cervical diskectomy and

likely aspiration pna. 



TREATMENT: 

Skin Interventions per Nursing Protocol:  Position changes: Q2H in bed, Q1 H in 
chair.

                                                            Skin kept from 
excessive moisture

_____________________________________________________



Pre-ulcer skin changes limited to persistent focal edema (Stage 1)

Abrasion, blister, partial thickness skin loss involving epidermis and/or 
dermis (Stage 2)

Full thickness skin loss involving damage or necrosis of SQ tissue. (Stage 3)

Necrosis of soft tissue through to underlying muscle, tendon, or bone. (Stage 4)

Purple or maroon discolored skin or blood filled blister

_____________________________________________________





Thank you,

Nan

(This form is maintained as a part of the permanent medical record)

 2015 [employer]  All Rights Reserved

Nan Solis RN, BSN    [email or web address]    Office: [phone]

                                                              

[facility]

## 2019-07-18 NOTE — PRG
DATE OF SERVICE:  07/18/2019



  I saw Mr. Yeager in his hospital room this morning.  He is talking more

clearly today than he has in the past.  He says that he had some thickened and 
ice

chips yesterday, but no Jell-O and no yoghurt and no applesauce.  He still has 
not

had many calories in since his admission, but he feels that the steroids are 
helping

him. 



On exam of Mr. Yeager, I do not find any changes in his wound.  He

remains afebrile.  His neurological function is stable from his last admission 
and

there were no new labs to report. 



 We will continue with the plan of steroids at 4 mg q.6 hours

throughout the day and begin a taper over the weekend, it would be a slow taper.

Hopefully, will speech, physical and occupational therapy can make some 
progress and

he will return to a place to improve his neurological function.  He remains on

antibiotics for pneumonitis.  We will follow him while he is in the hospital. 







Job ID:  [id number]



MTDD

## 2019-07-18 NOTE — PDOC.PN
- Subjective


Encounter Start Date: 07/18/19


Encounter Start Time: 09:00


Subjective: awake, says he ate some thickened food last evening, none this am


-: daughter at bedside, gave updates to her





- Objective


Resuscitation Status - Order Detail:





07/15/19 19:19


Resuscitation Status Routine 


   Resuscitation Status: FULL: Full Resuscitation


   Discussed with: POA: sister Ms.Anita PHILIP Reviewed: Yes


Vital Signs & Weight: 


 Vital Signs (12 hours)











  Temp Pulse Resp BP Pulse Ox


 


 07/18/19 08:00  96.7 F L  78  16  144/88 H  98


 


 07/18/19 06:51      100


 


 07/18/19 06:48   70  16   100








 Weight











Admit Weight                   134 lb 7.712 oz


 


Weight                         134 lb 7.712 oz














I&O: 


 











 07/17/19 07/18/19 07/19/19





 06:59 06:59 06:59


 


Intake Total 2379 2450 60


 


Balance 2379 2450 60











Result Diagrams: 


 07/16/19 04:54





 07/18/19 05:34


Additional Labs: 


 Accuchecks











  07/18/19 07/18/19 07/17/19





  11:43 03:33 19:41


 


POC Glucose  153 H  257 H  204 H














  07/17/19





  16:45


 


POC Glucose  229 H














Phys Exam





- Physical Examination


HEENT: PERRLA, sclera anicteric


Neck: no JVD, supple


Respiratory: no wheezing, no rales


Cardiovascular: RRR, no significant murmur


Gastrointestinal: soft, non-tender, positive bowel sounds


Musculoskeletal: pulses present


right hemiplegia, small muscle atrophy left hand


Psychiatric: A&O x 3





Dx/Plan


(1) Aspiration pneumonia


Code(s): J69.0 - PNEUMONITIS DUE TO INHALATION OF FOOD AND VOMIT   Status: 

Acute   


Qualifiers: 


   Laterality: right   Lung location: upper lobe of lung 





(2) Dysphagia


Code(s): R13.10 - DYSPHAGIA, UNSPECIFIED   Status: Acute   


Qualifiers: 


   Dysphagia type: unspecified   Qualified Code(s): R13.10 - Dysphagia, 

unspecified   





(3) Severe dehydration


Code(s): E86.0 - DEHYDRATION   Status: Acute   Comment: with electrolyte 

abnormalities, improving   





(4) Cord compression syndrome


Code(s): G95.20 - UNSPECIFIED CORD COMPRESSION   Status: Chronic   Comment: had 

ant cervical approach decompression 7/4/2019, slowly improving   





(5) History of CVA (cerebrovascular accident)


Code(s): Z86.73 - PRSNL HX OF TIA (TIA), AND CEREB INFRC W/O RESID DEFICITS   

Status: Chronic   Comment: with right hemiplegia   





(6) Seizure


Status: Chronic   





(7) DM2 (diabetes mellitus, type 2)


Status: Chronic   


Qualifiers: 


   Diabetes mellitus long term insulin use: without long term use 





(8) Dyslipidemia


Code(s): E78.5 - HYPERLIPIDEMIA, UNSPECIFIED   Status: Chronic   Comment: 

continue simvastatin   





(9) HTN (hypertension)


Code(s): I10 - ESSENTIAL (PRIMARY) HYPERTENSION   Status: Chronic   


Qualifiers: 


   Hypertension type: essential hypertension 


Comment: controlled   





(10) Physical deconditioning


Code(s): R53.81 - OTHER MALAISE   Status: Acute   





- Plan


on thickened liq, encourage po intake, will need assistance for feeding


-: freq oral care, calorie count, peg if not meeting atleast 50% of intake


-: is on decadron, iv fluids, protinix, keppra


-: clindamycin, nebs


-: PT/OT to mobilize him as tolerated, prognosis guarded





* .








Review of Systems





- Medications/Allergies


Allergies/Adverse Reactions: 


 Allergies











Allergy/AdvReac Type Severity Reaction Status Date / Time


 


No Known Drug Allergies Allergy   Verified 07/15/19 20:29











Medications: 


 Current Medications





Acetaminophen (Tylenol)  650 mg PO Q4H PRN


   PRN Reason: Headache/Fever/Mild Pain (1-3)


Acetaminophen (Tylenol)  650 mg TN Q4H PRN


   PRN Reason: Headache/Fever/Mild Pain (1-3)


Albuterol/Ipratropium (Duoneb)  3 ml NEB [id number] [facility]


   Last Admin: 07/18/19 06:48 Dose:  3 ml


Bisacodyl (Dulcolax)  10 mg TN DAILYPRN PRN


   PRN Reason: Constipation


Dexamethasone (Decadron)  4 mg SLOW IVP QID [facility]


   Last Admin: 07/18/19 10:11 Dose:  4 mg


Dextrose/Water (Dextrose 50%)  25 gm SLOW IVP PRN PRN


   PRN Reason: Hypoglycemia


Enoxaparin Sodium (Lovenox)  40 mg SC 0900 [facility]


   Last Admin: 07/18/19 10:12 Dose:  40 mg


Glucagon (Glucagon)  1 mg IM PRN PRN


   PRN Reason: Hypoglycemia


Guaifenesin/Dextromethorphan (Robitussin Dm)  15 ml PO Q4H PRN


   PRN Reason: Cough


Clindamycin Phosphate/Dextrose (600 mg/ Device)  50 mls @ 100 mls/hr IVPB Q8HR 

[facility]


   Last Admin: 07/18/19 05:18 Dose:  50 mls


Dextrose/Water (D5w)  1,000 mls @ 0 mls/hr IV .Q0M PRN


   PRN Reason: Hypoglycemia


Sodium Chloride (1/2 Normal Saline)  1,000 mls @ 100 mls/hr IV .Q10H [facility]


   Last Admin: 07/18/19 05:17 Dose:  1,000 mls


Levetiracetam 500 mg/ Device  100 mls @ 200 mls/hr IVPB BID [facility]


   Last Admin: 07/18/19 10:31 Dose:  100 mls


Insulin Glargine 10 units/ (Miscellaneous Medication)  0.1 mls @ 0 mls/hr SC 

BID [facility]


   Last Admin: 07/18/19 10:12 Dose:  0.1 mls


Insulin Human Lispro (Humalog)  0 units SC .MODERATE SLIDING SC PRN


   PRN Reason: Moderate Correctional Scale


   Last Admin: 07/18/19 05:18 Dose:  6 unit


Ondansetron HCl (Zofran)  4 mg IVP Q6H PRN


   PRN Reason: Nausea/Vomiting


Pantoprazole Sodium (Protonix)  40 mg IVP Q12HR [facility]


   Last Admin: 07/18/19 10:15 Dose:  40 mg


Senna/Docusate Sodium (Senokot S)  2 tab PO BID PRN


   PRN Reason: Constipation


   Last Admin: 07/18/19 05:19 Dose:  2 tab


Sodium Chloride (Normal Saline Pf)  10 ml FS PRN PRN


   PRN Reason: RECONSTITUTION

## 2019-07-19 NOTE — PRG
DATE OF SERVICE:  07/19/2019



I saw Mr. Yeager in this hospital room this morning.  He is continuing the steroids

and we will taper them down later today to 3 mg q.6.  Overnight, Mr. Yeager voice

seemed to improve.  He had some more liquid yesterday, but nothing thickened and no

solids yet.  His calorie count I am sure is quite low. 



I do not find any new neurological deficit, but he has dense right hemiparesis from

prior stroke.  His legs are weak and has hand dysfunction on the left side.  None of

this is any different than what it was before surgery. 



We are awaiting on swallowing to improve, but with his calorie count significantly

low, I think he has a better chance of recovery with the feeding tube.  We will give

him few more days of steroid medication to see if this allows him to turn the corner

and start more aggressive speech, occupational, and physical therapy. 







Job ID:  [id number]

## 2019-07-19 NOTE — RAD
XR Abdomen 1 View/KUB



HISTORY: Evaluation of Dobbhoff tube placement



COMPARISON: None.



FINDINGS: A high KUB was performed to evaluate the Dobbhoff tube. The feeding tube overlies the body 
region of the stomach.



IMPRESSION: Dobbhoff feeding tube in stomach.



Reported By: Goyo Prakash 

Electronically Signed:  7/19/2019 2:37 PM

## 2019-07-20 NOTE — PDOC.PN
- Subjective


Encounter Start Date: 07/20/19


Encounter Start Time: 12:44





Mr. Yeager was seen today in follow-up of Dysphagia following Anterior 

disckectomy. He has less pain today. He is tolerating the tube feeds.





- Objective


Resuscitation Status - Order Detail:





07/15/19 19:19


Resuscitation Status Routine 


   Resuscitation Status: FULL: Full Resuscitation


   Discussed with: POA: sister Ms.Anita PHILIP Reviewed: Yes


Vital Signs & Weight: 


 Vital Signs (12 hours)











  Temp Pulse Resp BP Pulse Ox


 


 07/20/19 08:20      94 L


 


 07/20/19 08:00  97.8 F  75  18  140/62  94 L


 


 07/20/19 06:35   74  14   100








 Weight











Admit Weight                   134 lb 7.712 oz


 


Weight                         134 lb 7.712 oz














I&O: 


 











 07/19/19 07/20/19 07/21/19





 06:59 06:59 06:59


 


Intake Total 1210 3138 30


 


Balance 1210 3138 30











Result Diagrams: 


 07/16/19 04:54





 07/18/19 05:34


Additional Labs: 


 Accuchecks











  07/20/19 07/20/19 07/19/19





  12:02 04:32 16:53


 


POC Glucose  234 H  217 H  153 H














Phys Exam





- Physical Examination


HEENT: PERRLA


Respiratory: no wheezing, no rales, no rhonchi, clear to auscultation bilateral


Cardiovascular: RRR, no significant murmur, no rub


Gastrointestinal: soft, non-tender, no distention, positive bowel sounds


Musculoskeletal: no edema, pulses present


+ right hemiparesis





Dx/Plan


(1) Dysphagia


Code(s): R13.10 - DYSPHAGIA, UNSPECIFIED   Status: Acute   


Qualifiers: 


   Dysphagia type: unspecified   Qualified Code(s): R13.10 - Dysphagia, 

unspecified   





(2) Aspiration pneumonia


Code(s): J69.0 - PNEUMONITIS DUE TO INHALATION OF FOOD AND VOMIT   Status: 

Acute   


Qualifiers: 


   Laterality: right   Lung location: upper lobe of lung 





(3) Severe dehydration


Code(s): E86.0 - DEHYDRATION   Status: Acute   Comment: with electrolyte 

abnormalities, improving   





(4) DM2 (diabetes mellitus, type 2)


Status: Chronic   


Qualifiers: 


   Diabetes mellitus long term insulin use: without long term use 





(5) HTN (hypertension)


Code(s): I10 - ESSENTIAL (PRIMARY) HYPERTENSION   Status: Chronic   


Qualifiers: 


   Hypertension type: essential hypertension 


Comment: controlled   





(6) History of CVA (cerebrovascular accident)


Code(s): Z86.73 - PRSNL HX OF TIA (TIA), AND CEREB INFRC W/O RESID DEFICITS   

Status: Chronic   Comment: with right hemiplegia   





(7) Seizure


Status: Chronic   





- Plan





* Dysphagia- continue DHT feedings for now. Continue IV steroids. Hopefully in 

a few more days the swelling will improve enough for him to take in oral 

nutrition


* Aspiration Pneumonia- continue clindamycin a few more days


* HTN- blood pressure is stable


* Seizure disorder- continue Keppra IV


* Hypomagnesemia- replace magnesium


* DM- blood glucose is stable - continue SSI.

## 2019-07-20 NOTE — PRG
DATE OF SERVICE:  07/20/2019



He tells me his voice is improved.  His swallowing is getting better, but he has not

had any food.  The nasogastric tube has been placed and he is on tube feeds

currently with his head of bed up.  I do not find any changes on his neurological

examination. 



We will leave the steroids at 3 mg q.6 hours for the next 2 days and going down to 2

mg for 3 days thereafter and 1 mg for 3 days thereafter, tapering slowly off the

Decadron, but keeping him on Protonix.  We will continue to follow him. 







Job ID:  [id number]

## 2019-07-21 NOTE — PRG
DATE OF SERVICE:  07/21/2019



Mr. Yeager is 6 days in the hospital now following dysphagia and dehydration.  He

had feeding tube placed nasally 2 days ago and has tolerated them well.  He is up to

full nutritional value.  Last night when I speak with him, there are no neurologic

changes.  However, in the last day, he has not tried any oral intake.  He states

that he is doing well, but that he did have some suction yesterday for oral

secretions.  We are tapering the Decadron; however, a permanent feeding tube may

need to be placed. 







Job ID:  [id number]

## 2019-07-21 NOTE — PRG
DATE OF SERVICE:  07/21/2019



I saw Mr. Yeager in his hospital room this morning.  His voice sounds a bit better,

but he is not taking anything p.o. since the NG tube was placed.  This may mean that

he would benefit from gastrostomy placement.  Anticipate this would be a temporary

maneuver probably over the next 2 to 4 perhaps 6 weeks before it can be removed and

he is eating again.  Speech Therapy will be quite beneficial in regaining swallowing

function as the swallowing from surgery dissipates. 







Job ID:  [id number]

## 2019-07-21 NOTE — PDOC.PN
- Subjective


Encounter Start Date: 07/21/19


Encounter Start Time: 12:11





Mr. Yeager was seen today in follow-up of dysphagia following ACD. He denies 

any throat pain today. He denies chest pain or difficulty breathing.





- Objective


Resuscitation Status - Order Detail:





07/15/19 19:19


Resuscitation Status Routine 


   Resuscitation Status: FULL: Full Resuscitation


   Discussed with: POA: sister Ms.Anita PHILIP Reviewed: Yes


Vital Signs & Weight: 


 Vital Signs (12 hours)











  Temp Pulse Resp BP Pulse Ox


 


 07/21/19 11:49  97.9 F  66  20  178/92 H  96


 


 07/21/19 08:40      97


 


 07/21/19 08:00  98.0 F  80  20  173/99 H  97


 


 07/21/19 01:19   80  18   97








 Weight











Admit Weight                   134 lb 7.712 oz


 


Weight                         134 lb 7.712 oz














I&O: 


 











 07/20/19 07/21/19 07/22/19





 06:59 06:59 06:59


 


Intake Total 3138 3722 30


 


Balance 3138 3722 30











Result Diagrams: 


 07/16/19 04:54





 07/21/19 05:28


Additional Labs: 


 Accuchecks











  07/21/19 07/21/19 07/20/19





  11:50 04:54 19:30


 


POC Glucose  196 H  249 H  201 H














  07/20/19 07/20/19





  17:01 12:02


 


POC Glucose  204 H  234 H














Phys Exam





- Physical Examination


HEENT: PERRLA


Respiratory: no wheezing, no rales, no rhonchi, clear to auscultation bilateral


Cardiovascular: RRR, no significant murmur, no rub


Gastrointestinal: soft, non-tender, no distention, positive bowel sounds


Musculoskeletal: no edema, pulses present


+ right sided hemiparesis





Dx/Plan


(1) Dysphagia


Code(s): R13.10 - DYSPHAGIA, UNSPECIFIED   Status: Acute   


Qualifiers: 


   Dysphagia type: unspecified   Qualified Code(s): R13.10 - Dysphagia, 

unspecified   





(2) Aspiration pneumonia


Code(s): J69.0 - PNEUMONITIS DUE TO INHALATION OF FOOD AND VOMIT   Status: 

Acute   


Qualifiers: 


   Laterality: right   Lung location: upper lobe of lung 





(3) Severe dehydration


Code(s): E86.0 - DEHYDRATION   Status: Acute   Comment: with electrolyte 

abnormalities, improving   





(4) DM2 (diabetes mellitus, type 2)


Status: Chronic   


Qualifiers: 


   Diabetes mellitus long term insulin use: without long term use 





(5) HTN (hypertension)


Code(s): I10 - ESSENTIAL (PRIMARY) HYPERTENSION   Status: Chronic   


Qualifiers: 


   Hypertension type: essential hypertension 


Comment: controlled   





(6) History of CVA (cerebrovascular accident)


Code(s): Z86.73 - PRSNL HX OF TIA (TIA), AND CEREB INFRC W/O RESID DEFICITS   

Status: Chronic   Comment: with right hemiplegia   





(7) Seizure


Status: Chronic   





- Plan





* Dysphagia following ACD- continue NG tube feeding- encourage him to try 

swallowing as tolerated


* HTN - blood pressure is labile- will continue to monitor


* DM- blood glucose is stable


* Seizure disorder- stable- continue Keppra IV.

## 2019-07-22 NOTE — PRG
DATE OF SERVICE:  



Mr. Ray Yeager is a 57-year-old male who is here for dehydration and dysphagia.  A

nasogastric tube was placed 2 days ago.  He is getting tube feeds and tolerating

them well.  He is speaking better more clearly, but still not swallowing anything.

His vital signs have been stable.  There are no new events recorded last night.  The

patient may need to have placement of a more permanent feeding tube in hopes of a

longer-term solution, but not permanent. 







Job ID:  [id number]

## 2019-07-22 NOTE — PRG
DATE OF SERVICE:  07/22/2019



I saw Mr. Yeager in his hospital room this morning.  He feels a bit better than

yesterday even.  He has no new complaints.  I do not see any fevers recorded among

his vital signs and rest look stable to me.  Mr. Yeager has a stable neurological

deficits from before his prior operation and no new weakness.  His voice is clear

and stronger today than it has been for me recently.  He does not report swallowing

yesterday. 



Mr. Yeager needs tube feed support for the next few weeks and a temporary PEG

placement seems like a reasonable thing to do.  We will continue to taper the

Decadron slowly.  We will go down to 2 mg every 6 hours starting tomorrow. 







Job ID:  [id number]

## 2019-07-22 NOTE — PDOC.PN
- Subjective


Encounter Start Date: 07/22/19


Encounter Start Time: 13:20





Mr. Yeager was seen today in follow-up of dysphagia following ACD. He says he 

has less pain in his throat, but he is not able to swallow well. 





- Objective


Resuscitation Status - Order Detail:





07/15/19 19:19


Resuscitation Status Routine 


   Resuscitation Status: FULL: Full Resuscitation


   Discussed with: POA: sister Ms.Anita PHILIP Reviewed: Yes


Vital Signs & Weight: 


 Vital Signs (12 hours)











  Temp Pulse Resp BP Pulse Ox


 


 07/22/19 13:54   76  14  


 


 07/22/19 08:00      98


 


 07/22/19 07:18   76  14  


 


 07/22/19 07:06  97.9 F  66  20  154/87 H  98








 Weight











Admit Weight                   134 lb 7.712 oz


 


Weight                         134 lb 7.712 oz














I&O: 


 











 07/21/19 07/22/19 07/23/19





 06:59 06:59 06:59


 


Intake Total 3722 2570 60


 


Balance 3722 2570 60











Result Diagrams: 


 07/16/19 04:54





 07/21/19 05:28


Additional Labs: 


 Accuchecks











  07/22/19 07/22/19 07/22/19





  16:54 12:14 04:58


 


POC Glucose  237 H  278 H  280 H














  07/21/19





  20:09


 


POC Glucose  242 H














Phys Exam





- Physical Examination


HEENT: PERRLA


Respiratory: no wheezing, no rales, no rhonchi, clear to auscultation bilateral


Cardiovascular: RRR, no significant murmur, no rub


Gastrointestinal: soft, non-tender, no distention, positive bowel sounds


Musculoskeletal: no edema, pulses present





Dx/Plan


(1) Dysphagia


Code(s): R13.10 - DYSPHAGIA, UNSPECIFIED   Status: Acute   


Qualifiers: 


   Dysphagia type: unspecified   Qualified Code(s): R13.10 - Dysphagia, 

unspecified   





(2) Aspiration pneumonia


Code(s): J69.0 - PNEUMONITIS DUE TO INHALATION OF FOOD AND VOMIT   Status: 

Acute   


Qualifiers: 


   Laterality: right   Lung location: upper lobe of lung 





(3) Severe dehydration


Code(s): E86.0 - DEHYDRATION   Status: Acute   Comment: with electrolyte 

abnormalities, improving   





(4) DM2 (diabetes mellitus, type 2)


Status: Chronic   


Qualifiers: 


   Diabetes mellitus long term insulin use: without long term use 





(5) HTN (hypertension)


Code(s): I10 - ESSENTIAL (PRIMARY) HYPERTENSION   Status: Chronic   


Qualifiers: 


   Hypertension type: essential hypertension 


Comment: controlled   





(6) History of CVA (cerebrovascular accident)


Code(s): Z86.73 - PRSNL HX OF TIA (TIA), AND CEREB INFRC W/O RESID DEFICITS   

Status: Chronic   Comment: with right hemiplegia   





(7) Seizure


Status: Chronic   





- Plan





* Dysphagia following ACD- continue steroids, and DHT feeding


* He has not been able to take in much by mouth


* Neurosurgery is recommending PEG tube placement for a short while


* Will consult GI


* HTN- blood pressure is a bit elevated now, but mostly stable


* Aspiration pneumonia- he has received a complete antibiotic course - will 

discontinue Clindamycin


* Seizure disorder- stable.

## 2019-07-22 NOTE — CON
DATE OF CONSULTATION:  2019



REASON FOR CONSULTATION:  Oropharyngeal dysphagia.  Neurosurgery has asked that we

see for possible PEG tube placement as he has been having dysphagia after anterior

cervical fusion. 



HISTORY OF PRESENT ILLNESS:  Mr. Yeager is a 57-year-old gentleman, who was admitted

to the hospital on the .  He had an anterior cervical diskectomy and fusion

after injury from falling out of his bed on .  He was discharged to [facility] for further recuperation, but there, he had some difficulty

swallowing.  He was choking both on liquids and pureed foods and was not getting

much in.  He has been on steroid here and he has not had improvement over the

weekend.  Neurosurgery saw him back today and felt that he would be best served with

a temporary PEG tube over the next few weeks. 



Review of systems; no throat pain, no hematemesis, no nausea, no vomiting. 



He denies having any problems with dysphagia after his previous stroke two years

ago.  The details surrounding his fall on  are unclear.  It apparently

involved rolling out of bed, striking his face in the ground.  MRI showed no

evidence of new strokes, but he did have severe stenosis and edema in the spinal

cord at that time that led to the initial surgery on . 



PAST MEDICAL HISTORY:  Prior CVA 2 years ago with right-sided weakness, type 2

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

in the past with surgical drainage, hypertension, dyslipidemia. 



MEDICATIONS:  Prior to admission;

1. Glipizide.

2. Keppra.

3. Lisinopril.

4. Pioglitazone.

5. Metformin.

6. Simvastatin.

7. Tizanidine.

8. Tylenol No. 3.



ALLERGIES:  NONE KNOWN.



SOCIAL HISTORY:  The patient's sister is here.  She watches over him.  He was at

[facility] prior to this admission.  He does not smoke or use

drugs, and does not drink. 



FAMILY HISTORY:  His mother with renal failure and diabetes.  Father  at age 87

of natural causes. 



PRESENT MEDICATIONS:  

1. P.r.n. Tylenol.

2. Clindamycin 100 mg IV q.8.

3. Decadron 4 mg IV b.i.d., slow push.

4. Lovenox.

5. P.r.n. glucagon.

6. Sliding scale insulin.

7. Levetiracetam 500 mg b.i.d.

8. P.r.n. Zofran.

9. Protonix.

10. Senokot-S.



REVIEW OF SYSTEMS:  Negative for odynophagia.  Negative for fever or chills.

Negative for cough.  Negative for shortness of breath.  Negative for abdominal pain.

 No prior history of abdominal surgeries. 



LABORATORY DATA:  White count was 10 on the 16th, hemoglobin 10.5, platelet count

213.  BMP normal except for glucose 287. 



ASSESSMENT:  Oropharyngeal dysphagia after recent fall with cervical spine injury

and semi-emergent ACDF.  Despite conservative measures since his surgery on the ,

he was not improving.  He was admitted here with some dehydration and really

difficulty swallowing.  He has had a Dobhoff tube placed.  He has not really

responded to Decadron over the weekend and Neurosurgery feels that he would benefit

from PEG tube placement, which shall be temporary.  Discussed the risks, benefits,

and possible complications of PEG tube placement including perforation, bleeding,

reaction to medication, aspiration, infection, and anesthetic complications with the

patient and family.  He will also need stay in for 6 weeks even his falling has

improved, so he can all heal in place before it is removed.  The patient's sister

and the patient himself wanted to go ahead and proceed with that, and we will go

ahead and set that up for tomorrow.  We will hold the Lovenox tomorrow morning's

dose. 







Job ID:  [id number]

## 2019-07-23 NOTE — OP
DATE OF PROCEDURE:  07/23/2019



PROCEDURES PERFORMED:  Esophagogastroduodenoscopy with percutaneous endoscopic

gastrostomy tube placement. 



PREMEDICATION:  Given by Anesthesiology Department.



PREPROCEDURE DIAGNOSIS:  Oropharyngeal dysphagia.



POSTPROCEDURE DIAGNOSES:  

1. Normal upper endoscopy.

2. Status post placement of 20-Gh[place]ian gastrostomy feeding tube.



DESCRIPTION OF PROCEDURE:  Written consents were obtained prior to procedure.  After

adequate sedation, the forward-viewing endoscope was advanced down the stomach and

under direct vision to the second portion of duodenum.  The duodenum appeared

normal.  The stomach was insufflated and appeared normal.  The pulses patent.  The

GE junction and esophagus appeared normal.  A site was selected along the left upper

quadrant with good transillumination and ballottement.  The area was cleaned with

Betadine and anesthetized with 1% xylocaine.  A small incision was made.  The trocar

was then introduced into the gastric lumen.  The guidewire was then fed through and

grasped with a snare and brought out along with the endoscope.  The external latch

was then firmly secured.  Repeat endoscopy performed good placement.  The patient

tolerated the procedure well without any complication. 



ASSESSMENT:  Status post placement of 20-Ghanaian gastrostomy feeding tube.



RECOMMENDATION:  Resume tube feeding later today.







Job ID:  [id number]

## 2019-07-23 NOTE — PRG
DATE OF SERVICE:  07/23/2019



Mr. Yeager has been scheduled for gastrostomy placement.  He had no events

overnight.  Reports that he has not been out of his bed since being in the 
hospital. 



I do not see a fever recorded among the vital signs and his vitals are 
relatively

stable, although at times, his blood pressure goes up to 170s. 



I do not find any changes on his neurological examination.  His neck incision is

still healing well. 



Mr. Yeager will heal faster with the increased calorie intake through the 
gastrostomy.  The

neck swelling related to surgery will taper off over the coming weeks and the

gastrostomy will likely be able to be removed.  He will need speech therapy,

physical therapy and occupational therapy.  I think a MEDIchair placement to 
just to

get him out of bed and change the pressure points of his body.  It is a 
reasonable

thing to do.  I hate to see him get a decubitus or have trouble fighting off his

pneumonia because of lack of motion. 







Job ID:  [id number]



MTDD

## 2019-07-23 NOTE — PDOC.HOSPP
- Subjective


Subjective: 





Mr. Yeager was seen today in follow-up of dysphagia following ACD. He says his 

throat is feeling better. He has less discomfort. 





- Objective


Vital Signs & Weight: 


 Vital Signs (12 hours)











  Temp Pulse Pulse Resp BP BP BP


 


 07/23/19 12:02  97.6 F  79   16   124/80 


 


 07/23/19 08:40    78   156/88 H  


 


 07/23/19 08:22  98.1 F  78   16    156/88 H


 


 07/23/19 08:00       


 


 07/23/19 06:52   59 L   16   














  Pulse Ox Pulse Ox


 


 07/23/19 12:02  100 


 


 07/23/19 08:40   99


 


 07/23/19 08:22  99 


 


 07/23/19 08:00  99 


 


 07/23/19 06:52  99 








 Weight











Admit Weight                   134 lb 7.712 oz


 


Weight                         134 lb 7.712 oz














I&O: 


 











 07/22/19 07/23/19 07/24/19





 06:59 06:59 06:59


 


Intake Total 2570 535 


 


Balance 2570 535 











Result Diagrams: 


 07/16/19 04:54





 07/21/19 05:28


Additional Labs: 


 Accuchecks











  07/23/19 07/23/19 07/23/19





  13:04 11:32 04:35


 


POC Glucose  150 H  133 H  221 H














  07/23/19 07/22/19 07/22/19





  02:40 19:46 16:54


 


POC Glucose  217 H  214 H  237 H














ROS





- Review of Systems


All systems: 


All other ROS were reviewed and found negative.








- Medication


Medications: 


Active Medications











Generic Name Dose Route Start Last Admin





  Trade Name Missy  PRN Reason Stop Dose Admin


 


Albuterol/Ipratropium  3 ml  07/16/19 01:00  07/23/19 13:55





  Duoneb  NEB   Not Given





  [id number] MAXIMILIAN   





     





     





     





     


 


Dexamethasone  4 mg  07/22/19 09:00  07/23/19 08:27





  Decadron  SLOW IVP   4 mg





  BID MAXIMILIAN   Administration





     





     





     





     


 


Enoxaparin Sodium  40 mg  07/16/19 09:00  07/23/19 08:31





  Lovenox  SC   Not Given





  0900 MAXIMILIAN   





     





     





     





     


 


Levetiracetam 500 mg/ Device  100 mls @ 200 mls/hr  07/16/19 21:00  07/23/19 10:

02





  IVPB   100 mls





  BID MAXIMILIAN   Administration





     





     





     





     


 


Insulin Glargine 14 units/  0.14 mls @ 0 mls/hr  07/22/19 21:00  07/22/19 20:46





  Miscellaneous Medication  SC   0.14 mls





  HS MAXIMILIAN   Administration





     





     





     





     


 


Insulin Glargine 14 units/  0.14 mls @ 0 mls/hr  07/22/19 09:00  07/23/19 08:48





  Miscellaneous Medication  SC   Not Given





  QAM MAXIMILIAN   





     





     





     





     


 


Insulin Human Lispro  0 units  07/15/19 19:24  07/22/19 17:43





  Humalog  SC   4 unit





  .MODERATE SLIDING SC PRN   Administration





  Moderate Correctional Scale   





     





     





     


 


Pantoprazole Sodium  40 mg  07/16/19 09:00  07/23/19 08:31





  Protonix  IVP   40 mg





  Q12HR MAXIMILIAN   Administration





     





     





     





     


 


Senna/Docusate Sodium  2 tab  07/15/19 19:24  07/18/19 05:19





  Senokot S  PO   2 tab





  BID PRN   Administration





  Constipation   





     





     





     


 


Sodium Chloride  10 ml  07/16/19 06:50  07/22/19 20:46





  Normal Saline Pf  FS   10 ml





  PRN PRN   Administration





  RECONSTITUTION   





     





     





     














- Exam


Eye: PERRL, anicteric sclera


Heart: RRR, no murmur, no gallops


Respiratory: CTAB, no wheezes, no rales, no ronchi


Gastrointestinal: soft, non-tender, non-distended


Neurological: negative: CN's grossly intact, normal sensation to touch, no 

weakness, no focal deficits (Right hemiplegia), no new deficit, facial droop, 

hemiplegia, speech deficit, vision deficit





Hosp A/P


(1) Dysphagia


Code(s): R13.10 - DYSPHAGIA, UNSPECIFIED   Status: Acute   


Qualifiers: 


   Dysphagia type: unspecified   Qualified Code(s): R13.10 - Dysphagia, 

unspecified   





(2) Aspiration pneumonia


Code(s): J69.0 - PNEUMONITIS DUE TO INHALATION OF FOOD AND VOMIT   Status: 

Resolved   


Qualifiers: 


   Laterality: right   Lung location: upper lobe of lung 





(3) Severe dehydration


Code(s): E86.0 - DEHYDRATION   Status: Resolved   





(4) DM2 (diabetes mellitus, type 2)


Status: Chronic   


Qualifiers: 


   Diabetes mellitus long term insulin use: without long term use 





(5) HTN (hypertension)


Code(s): I10 - ESSENTIAL (PRIMARY) HYPERTENSION   Status: Chronic   


Qualifiers: 


   Hypertension type: essential hypertension 





(6) History of CVA (cerebrovascular accident)


Code(s): Z86.73 - PRSNL HX OF TIA (TIA), AND CEREB INFRC W/O RESID DEFICITS   

Status: Chronic   





(7) Seizure


Status: Chronic   





- Plan





* Dysphagia- due to pharyngeal swelling post ACD. Continue IV steroids, and he 

will go for PEG tube placement today


* DM- Blood glucose is a bit better today


* Seizure disorder- stable


* HTN- blood pressure is a bit labile but overall stable


* He has complated therapy for aspiration pneumonia

## 2019-07-24 NOTE — PDOC.HOSPP
- Subjective


Subjective: 





Patient seen and examined. No new complaints. No overnight events





- Objective


Vital Signs & Weight: 


 Vital Signs (12 hours)











  Temp Pulse Resp BP Pulse Ox


 


 07/24/19 07:51  98.1 F  92  16  95/60  100


 


 07/24/19 06:59   68  12  


 


 07/23/19 23:41      98








 Weight











Admit Weight                   134 lb 7.712 oz


 


Weight                         134 lb 7.712 oz














I&O: 


 











 07/23/19 07/24/19 07/25/19





 06:59 06:59 06:59


 


Intake Total 535 1560 


 


Balance 535 1560 











Result Diagrams: 


 07/16/19 04:54





 07/21/19 05:28


Additional Labs: 


 Accuchecks











  07/24/19 07/24/19 07/23/19





  05:37 02:10 20:32


 


POC Glucose  185 H  166 H  127 H














  07/23/19 07/23/19 07/23/19





  16:46 13:04 11:32


 


POC Glucose  158 H  150 H  133 H














ROS





- Review of Systems


All systems: 


All other ROS were reviewed and found negative.





Constitutional: denies: fever, chills, sweats, weakness, malaise, other


Eyes: denies: pain, vision change, conjunctivae inflammation, eyelid 

inflammation, redness, other


ENT: denies: ear pain, ear discharge, nose pain, nose discharge, nose congestion

, mouth pain, mouth swelling, throat pain, throat swelling, other


Respiratory: denies: cough, dry, shortness of breath, hemoptysis, SOB with 

excertion, pleuritic pain, sputum, wheezing, other


Cardiovascular: denies: chest pain, palpitations, orthopnea, paroxysmal noc. 

dyspnea, edema, light headedness, other


Gastrointestinal: denies: nausea, vomitting, abdominal pain, diarrhea, 

constipation, melena, hematochezia, other


Genitourinary: denies: dysuria, frequency, incontinence, hematuria, retention, 

other


Musculoskeletal: denies: neck pain, shoulder pain, arm pain, back pain, hand 

pain, leg pain, foot pain, other





- Medication


Medications: 


Active Medications











Generic Name Dose Route Start Last Admin





  Trade Name Freq  PRN Reason Stop Dose Admin


 


Albuterol/Ipratropium  3 ml  07/16/19 01:00  07/24/19 06:59





  Duoneb  NEB   3 ml





  [id number] MAXIMILIAN   Administration





     





     





     





     


 


Dexamethasone  2 mg  07/24/19 09:00  07/24/19 11:07





  Decadron  PO  07/27/19 09:01  2 mg





  TID MAXIMILIAN   Administration





     





     





     





     


 


Enoxaparin Sodium  40 mg  07/16/19 09:00  07/24/19 11:10





  Lovenox  SC   40 mg





  0900 MAXIMILIAN   Administration





     





     





     





     


 


Levetiracetam 500 mg/ Device  100 mls @ 200 mls/hr  07/16/19 21:00  07/24/19 10:

34





  IVPB   100 mls





  BID MAXIMILIAN   Administration





     





     





     





     


 


Insulin Glargine 14 units/  0.14 mls @ 0 mls/hr  07/22/19 21:00  07/23/19 21:55





  Miscellaneous Medication  SC   0.14 mls





  HS MAXIMILIAN   Administration





     





     





     





     


 


Insulin Glargine 14 units/  0.14 mls @ 0 mls/hr  07/22/19 09:00  07/24/19 10:34





  Miscellaneous Medication  SC   0.14 mls





  QAM MAXIMILIAN   Administration





     





     





     





     


 


Insulin Human Lispro  0 units  07/15/19 19:24  07/24/19 05:46





  Humalog  SC   2 unit





  .MODERATE SLIDING SC PRN   Administration





  Moderate Correctional Scale   





     





     





     


 


Pantoprazole Sodium  40 mg  07/16/19 09:00  07/24/19 11:10





  Protonix  IVP   40 mg





  Q12HR MAXIMILIAN   Administration





     





     





     





     


 


Senna/Docusate Sodium  2 tab  07/15/19 19:24  07/18/19 05:19





  Senokot S  PO   2 tab





  BID PRN   Administration





  Constipation   





     





     





     


 


Sodium Chloride  10 ml  07/16/19 06:50  07/22/19 20:46





  Normal Saline Pf  FS   10 ml





  PRN PRN   Administration





  RECONSTITUTION   





     





     





     














- Exam


NAD, awake alert


ENT: normocephalic atraumatic, no oropharyngeal lesions


Neck: supple, symmetric, no JVD, no Thyromegaly


Heart: RRR, no murmur, no gallops, no rubs


Respiratory: no wheezes, no rales, no ronchi


Gastrointestinal: soft, non-tender, non-distended, normal bowel sounds


Extremities: no clubbing, no edema


Skin: normal turgor, no lesions


Psychiatric: normal affect, normal behavior





Hosp A/P


(1) Dysphagia


Code(s): R13.10 - DYSPHAGIA, UNSPECIFIED   Status: Acute   


Qualifiers: 


   Dysphagia type: oropharyngeal phase   Qualified Code(s): R13.12 - Dysphagia, 

oropharyngeal phase   


Plan: 


peg tube placement, now continue tube feeding








(2) Physical deconditioning


Code(s): R53.81 - OTHER MALAISE   Status: Chronic   


Plan: 


continue PT/OT








(3) History of left below knee amputation


Code(s): Z89.512 - ACQUIRED ABSENCE OF LEFT LEG BELOW KNEE   Status: Chronic   





(4) Cord compression syndrome


Code(s): G95.20 - UNSPECIFIED CORD COMPRESSION   Status: Chronic   





(5) DM2 (diabetes mellitus, type 2)


Status: Chronic   


Qualifiers: 


   Diabetes mellitus long term insulin use: without long term use 


Plan: 


controlled








(6) Dyslipidemia


Code(s): E78.5 - HYPERLIPIDEMIA, UNSPECIFIED   Status: Chronic   





(7) HTN (hypertension)


Code(s): I10 - ESSENTIAL (PRIMARY) HYPERTENSION   Status: Chronic   


Qualifiers: 


   Hypertension type: essential hypertension 





(8) History of CVA (cerebrovascular accident)


Code(s): Z86.73 - PRSNL HX OF TIA (TIA), AND CEREB INFRC W/O RESID DEFICITS   

Status: Chronic   





(9) Seizure


Status: Chronic   





- Plan


old records reviewed/req, PT/OT, 





tube feeding as tolerated


will go to [facility] on discharge

## 2019-07-24 NOTE — PRG
DATE OF SERVICE:  07/24/2019



Mr. Yeager is day 9 in the hospital for delayed dysphagia following ACDF on July 4th.  He was dehydrated and lacking nutrition.  With the addition of steroids, he

has shown some improvement but not enough for proper oral intake.  PEG tube was

placed yesterday.  I have seen Mr. Yeager this morning.  He is actually working with

Physical Therapy.  When I entered the room, they have him sitting on the edge of the

bed.  Mr. Yeager is in good spirits.  He is talking more clearly to me today and is

happy to be working with therapy.  There were no fevers recorded.  No events

overnight.  We will continue to taper his Decadron when arrangements have been made

for him to return to rehab and he has had good intake.  There is no neurosurgical

intervention that is necessary at this time. 







Job ID:  [id number]

## 2019-07-25 NOTE — PRG
DATE OF SERVICE:  07/25/2019



This is Jin Isabel PA-C dictating a report for Kenji Foss MD.



Mr. Yeager is seen in followup after he had a PEG tube placement for significant

dysphagia.  He will follow up with Dr. Toussaint and Matilde at a later date.  On

exam today, he states he is tired, but does not complain of any pain.  He has a

stable neurologic exam and that he has significant right-sided weakness, but is

antigravity in the left arm and leg.  He is oriented to person, place, and time.  He

is ready for discharge at any time to rehab, but otherwise, he is stable from a

neurosurgical perspective.  He does not have any hoarseness on exam or any muffled

voice tones. 







Job ID:  [id number]

## 2019-07-25 NOTE — PRG
DATE OF SERVICE:  07/24/2019



SUBJECTIVE:  Mr. Yeager had a PEG placed yesterday.  He is tolerating tube feeds

according to his nurse. 



OBJECTIVE:  VITAL SIGNS:  Temperature is 98, pulse is 92, blood pressure is 95/60 to

125/80. 

GENERAL:  He is without complaints.  He is resting. 

ABDOMEN:  Soft and nontender.  The PEG tube site looks clean and dry.



LABORATORY DATA:  None.



ASSESSMENT:  Status post PEG tube placement for oropharyngeal dysphagia related to

recent cervical spine surgery and remote history of stroke. 



RECOMMENDATIONS:  If he gets to where the swelling in the cervical spine region

decreases to where he can eat again, then we can take the PEG tube out in the

office.  We will need to wait at least 6 weeks.   __________ DVT precautions. 







Job ID:  [id number]

## 2019-07-25 NOTE — DIS
DATE OF ADMISSION:  07/15/2019



DATE OF DISCHARGE:  07/25/2019



PRIMARY CARE PHYSICIAN:  [facility].



DISCHARGE DISPOSITION:  [facility].



PRIMARY DISCHARGE DIAGNOSES:  

1. Oropharyngeal dysphagia, status post PEG tube placement.

2. Aspiration pneumonia, treated in the hospital.

3. Dehydration, corrected.



SECONDARY DISCHARGE DIAGNOSES:  

1. History of cerebrovascular accident.

2. Seizure disorder.

3. History of left below-knee amputation.

4. Hypertension.

5. Dyslipidemia.

6. Diabetes type 2.

7. Physical deconditioning.



PRIMARY PROCEDURE/OPERATION:  PEG tube placement.



RADIOLOGICAL INVESTIGATION:  Chest x-ray, soft tissue neck CT scan, abdomen x-ray.



SIGNIFICANT LABORATORY DATA:  Hemoglobin 10.5.  Creatinine 0.70.



DISCHARGE MEDICATIONS:  

1. Glucotrol XL 5 mg per tube b.i.d.

2. Keppra 500 mg per tube b.i.d.

3. Lisinopril 10 mg per tube daily.

4. Metformin 1000 mg per tube b.i.d.

5. Pioglitazone 30 mg per tube daily.

6. Zocor 10 mg per tube at bedtime.

7. Tylenol No.3 one or two tablets q.6 hourly per tube as needed.

8. Decadron 2 mg t.i.d. for 2 days, then 2 mg b.i.d. for 5 days, then 1 mg b.i.d.

for 5 days, and 1 mg daily for 5 days. 

9. Lantus insulin 14 units subcu b.i.d.

10. Protonix 40 mg per tube daily.



CONTRAINDICATION:  None.



CODE STATUS:  Full code.



INPATIENT CONSULTANT:  

1. Neurosurgery.

2. Gastroenterology.



TEST RESULT PENDING ON DISCHARGE:  None.



ALLERGIES:  NO KNOWN DRUG ALLERGIES.



DISCHARGE PLAN:  Post hospital, the patient will follow up with primary care

physician. 



HOSPITAL COURSE:  A 57-year-old male, who had a previous stroke, and he had recently

an anterior cervical diskectomy performed, and after that, the patient was having

worsening of oropharyngeal dysphagia.  The patient was significantly dehydrated and

that is why he was admitted in our hospital by Dr. Irvin.  Please see his H

and P for further details.  This patient was hydrated with IV fluid.  During this

admission, we tried our possible way to hydrate him, but his oral function has not

improved and that is why he required PEG tube placement for nutritional support and

hydration.  After PEG tube, the patient was tolerating PEG tube feeding well.  He

was more alert and up to his baseline level.  Neurosurgery cleared him for

discharge.  The patient will continue to get PEG tube feeding and medication via PEG

tube at nursing home. 



I have seen and examined the patient bedside today.  His examination is pretty much

baseline and stable.  Paperwork for discharge done and discharge medication

reconciliation done. 







Job ID:  [id number]

## 2019-07-29 NOTE — RAD
EXAM: Single view of the chest



HISTORY:   CVA with right-sided deficits



COMPARISON: 7/15/2019



FINDINGS: Single view of the chest shows a normal sized cardiomediastinal silhouette. There is no jovita
dence of consolidation, mass, or pleural effusion. The bones are unremarkable.



IMPRESSION: No evidence of acute cardiopulmonary disease



Reported By: Chapito Jameson 

Electronically Signed:  7/29/2019 1:46 PM

## 2019-07-29 NOTE — CT
CT ABDOMEN AND PELVIS:

 

07/29/2019

 

HISTORY:

Evaluate for small bowel obstruction.

 

COMPARISON:

None.

 

TECHNIQUE:

Axial CT imaging at 5 mm intervals, from the lung bases through the pubic symphysis, with oral contra
st.  Coronal reformatted imaging obtained.

 

FINDINGS:

Lack of IV contrast limits assessment of the viscera and vascular structures, and for lymphadenopathy
.

 

There is patchy increased density in both lung bases, posteromedially, which may signify infiltrate, 
volume loss, or scar.  There is a percutaneous gastrostomy tube in place.  No free intraperitoneal ai
r.

 

Limited assessment of the liver, spleen, pancreas, adrenal glands, and kidneys appears unremarkable. 
 Cholelithiasis is noted.

 

No evidence for bowel obstruction.

 

There is mild diffuse colonic distention with a mild degree of stool seen throughout the colon, most 
prominent in the region of the right hemicolon.  The appendix appears unremarkable.  There is extensi
ve atherosclerotic calcification within the imaged lower extremities.  Review of the osseous structur
es demonstrates no worrisome lytic or blastic bone lesion.

 

IMPRESSION:

1.  No evidence for small bowel obstruction.

 

2.  Incidental findings as above.

 

POS: HUE

## 2019-07-30 NOTE — PDOC.HOSPP
- Subjective


Subjective: 





pt up in bed no complains





- Objective


Vital Signs & Weight: 


 Vital Signs (12 hours)











  Temp Pulse Pulse Resp BP BP BP


 


 07/30/19 15:35  98.3 F  87   18   


 


 07/30/19 11:55  100.1 F H  97   18   


 


 07/30/19 11:16    101 H    103/57 L  100/64


 


 07/30/19 08:23  98.9 F  101 H   16  103/57 L  














  BP Pulse Ox Pulse Ox


 


 07/30/19 15:35  112/67  96 


 


 07/30/19 11:55  100/64  95 


 


 07/30/19 11:16    97


 


 07/30/19 08:23   98 








 Weight











Admit Weight                   142 lb 8 oz


 


Weight                         139 lb














I&O: 


 











 07/29/19 07/30/19 07/31/19





 06:59 06:59 06:59


 


Intake Total  2099 300


 


Output Total  300 


 


Balance  1799 300











Result Diagrams: 


 07/30/19 06:25





 07/30/19 06:24


Additional Labs: 


 Accuchecks











  07/30/19 07/30/19 07/30/19





  11:59 05:35 00:04


 


POC Glucose  139 H  88  102














  07/29/19 07/29/19





  20:13 17:54


 


POC Glucose  55 L*  63 L














ROS





- Review of Systems


All systems: 


All other ROS were reviewed and found negative.





Respiratory: denies: cough, dry, shortness of breath, hemoptysis, SOB with 

excertion, pleuritic pain, sputum, wheezing, other


Cardiovascular: denies: chest pain, palpitations, orthopnea, paroxysmal noc. 

dyspnea, edema, light headedness, other





- Medication


Medications: 


Active Medications











Generic Name Dose Route Start Last Admin





  Trade Name Freq  PRN Reason Stop Dose Admin


 


Dexamethasone  1 mg  07/30/19 08:00  07/30/19 09:25





  Decadron  PO   1 mg





  BID-WM MAXIMILIAN   Administration





     





     





     





     


 


Enoxaparin Sodium  40 mg  07/30/19 09:00  07/30/19 09:25





  Lovenox  SC   40 mg





  0900 MAXIMILIAN   Administration





     





     





     





     


 


Levetiracetam  500 mg  07/29/19 21:00  07/30/19 09:26





  Keppra Oral Solution  PER TUBE   500 mg





  BID MAXIMILIAN   Administration





     





     





     





     


 


Pantoprazole Sodium  40 mg  07/30/19 09:00  07/30/19 09:25





  Protonix  PER TUBE   40 mg





  DAILY MAXIMILIAN   Administration





     





     





     





     


 


Simvastatin  10 mg  07/29/19 21:00  07/29/19 22:29





  Zocor  PER TUBE   10 mg





  HS MAXIMILIAN   Administration





     





     





     





     














- Exam


Heart: negative: RRR, no murmur, no gallops, no rubs, normal peripheral pulses, 

irregular, diminshed peripheral pulses, murmur present, II/IV, III/IV


Respiratory: negative: CTAB, no wheezes, no rales, no ronchi, normal chest 

expansion, no tachypnea, normal percussion, rales, rhonchi, tachypneic, wheezes


Gastrointestinal: negative: soft, non-tender, non-distended, normal bowel sounds

, no palpable masses, no hepatomegaly, no splenomegaly, no bruit, tender to 

palpation, distended, diminished bowl sounds, voluntary guarding





Hosp A/P


(1) Hypoglycemia


Code(s): E16.2 - HYPOGLYCEMIA, UNSPECIFIED   Status: Acute   





(2) Acute CVA (cerebrovascular accident)


Code(s): I63.9 - CEREBRAL INFARCTION, UNSPECIFIED   Status: Acute   





(3) DM2 (diabetes mellitus, type 2)


Status: Chronic   





(4) Seizure


Status: Chronic   





- Plan





will check hbgalc. His oral antihyperglycemics stopped. will stop his d5ns and 

continue to check his blood sugars. He is on bolus peg tube feedings. if blood 

sugars stable will discharge in am

## 2019-08-01 NOTE — DIS
DATE OF ADMISSION:  07/29/2019



DATE OF DISCHARGE:  07/31/2019



DISCHARGE DIAGNOSES:  As of the following;

1. Hypoglycemia.

2. History of cerebrovascular accident.

3. Diabetes. 

4. Seizure.



HOSPITAL COURSE:  The patient is a 57-year-old male, who is a nursing home resident,

who presented to the hospital with hypoglycemia.  He did have a CT of abdomen and

pelvis in the ER, which did not indicate any acute bowel obstruction.  However, he

had mild diffuse colonic distention and mild degree of stool that was seen

throughout the colon.  The patient did have bowel movements.  He initially was on

bolus feeds.  However, this was changed to continuous feedings, which he tolerated

much better.  He was initially on D5 NS drip for his hypoglycemia, which then

resolved, and the drip was discontinued.  His hemoglobin A1c was 6.6. 



HOME MEDICATIONS:  Will be as of the following;

1. Metformin 1000 mg b.i.d.

2. Keppra 500 mg b.i.d.

3. Zestril 10 mg daily.

4. Simvastatin 10 mg at bedtime.

5. Dexamethasone 4 mg daily.

6. Benadryl 25 mg q.6 hours p.r.n. 

7. Tizanidine 4 mg q.8 hours p.r.n.

8. I also recommended to taper the Decadron since this was started previously and I

do not believe this has been tapered.  I recommended the nursing home physician to

taper this in the next few weeks. 



Also, the patient's bolus feeds that he was getting in the nursing home was changed

per dietitian's recommendation to continuous feeds since the patient has not been

tolerating the bolus feeds and he was found to be very gurgly, this is per nursing

assessment.  He was also on, I believe, pioglitazone, which was discontinued and he

was also on glipizide 5 mg twice a day, which was discontinued, and he was on Lantus

10 units subcu twice a day.  I __________ up to the nursing home physician to start

any other medications for his sugars if his sugars go high; however, the hemoglobin

A1c of 6.6, I think it is pretty reasonably controlled when the patient is having

more hypoglycemic episodes with so many medications on board. 



PHYSICAL EXAMINATION:

VITAL SIGNS:  As of the following; temperature 98.6, pulse 73, blood pressure

108/72, respirations 18, 97% on room air. 

GENERAL:  He is awake, alert, and oriented x3.  Does not appear in distress. 

CV:  S1 and S2 present.  No murmurs, rubs, or gallops. 

ABDOMEN:  Soft and nontender.  Bowel sounds are present. 

EXTREMITIES:  No edema.







Job ID:  [id number]

## 2019-08-06 NOTE — EKG
Test Reason : 

Blood Pressure : ***/*** mmHG

Vent. Rate : 082 BPM     Atrial Rate : 082 BPM

   P-R Int : 174 ms          QRS Dur : 094 ms

    QT Int : 382 ms       P-R-T Axes : 070 049 055 degrees

   QTc Int : 446 ms

 

Normal sinus rhythm

Normal ECG

 

Confirmed by MALIA LEON DO ([id number]),  CHUY SAAVEDRA ([id number]) on 8/6/2019 1:29:41 PM

 

Referred By:             Confirmed By:MALIA LEON DO

## 2023-11-12 NOTE — PDOC.PN
- Subjective


Encounter Start Date: 07/02/19


Encounter Start Time: 10:44





Patient seen and examined, no new issues, family at bedside, all questions 

answered. 





- Objective


Resuscitation Status - Order Detail:





06/28/19 16:45


Resuscitation Status Routine 


   Resuscitation Status: FULL: Full Resuscitation








Vital Signs & Weight: 


 Vital Signs (12 hours)











  Temp Pulse Resp BP BP Pulse Ox


 


 07/02/19 08:44     109/63  


 


 07/02/19 07:20  98.0 F  62  20   109/63  99


 


 07/02/19 04:00  98.0 F  65  16   109/68  98


 


 07/02/19 00:00  98.4 F  66  16   133/63  90 L








 Weight











Weight                         152 lb 4 oz














I&O: 


 











 07/01/19 07/02/19 07/03/19





 06:59 06:59 06:59


 


Intake Total 540 820 300


 


Balance 540 820 300











Result Diagrams: 


 06/29/19 05:59





 06/29/19 05:59


Additional Labs: 


 Accuchecks











  07/02/19 07/01/19 07/01/19





  05:26 20:44 16:45


 


POC Glucose  175 H  163 H  125 H














  07/01/19





  10:56


 


POC Glucose  197 H














Phys Exam





- Physical Examination


Constitutional: NAD


HEENT: PERRLA, moist MMs


neck brace in place


Respiratory: no wheezing, no rales


Cardiovascular: RRR, no significant murmur, no rub


Gastrointestinal: soft, non-tender, no distention, positive bowel sounds


Musculoskeletal: pulses present, edema present





Dx/Plan


(1) History of CVA (cerebrovascular accident)


Code(s): Z86.73 - PRSNL HX OF TIA (TIA), AND CEREB INFRC W/O RESID DEFICITS   

Status: Acute   





(2) Gait instability


Code(s): R26.81 - UNSTEADINESS ON FEET   Status: Acute   





(3) Hemiplegia affecting right dominant side


Code(s): G81.91 - HEMIPLEGIA, UNSPECIFIED AFFECTING RIGHT DOMINANT SIDE   Status

: Acute   


Qualifiers: 


   Hemiplegia type: spastic   Hemiplegia etiology: late effect of 

cerebrovascular disease   Cerebrovascular disease type: cerebral infarction   

Qualified Code(s): I69.351 - Hemiplegia and hemiparesis following cerebral 

infarction affecting right dominant side   





(4) Seizure


Status: Acute   





(5) DM2 (diabetes mellitus, type 2)


Status: Chronic   





(6) Dyslipidemia


Code(s): E78.5 - HYPERLIPIDEMIA, UNSPECIFIED   Status: Chronic   





(7) HTN (hypertension)


Code(s): I10 - ESSENTIAL (PRIMARY) HYPERTENSION   Status: Chronic   


Qualifiers: 


 





- Plan





* pending neuro sx intervension


* no changes in plan of care otherwise


* cont current plan of care


* case and plan d/w patient and family at length, they understood and agreed 

with this plan. No

## 2024-01-24 NOTE — PDOC.PN
- Subjective


Encounter Start Date: 07/19/19


Encounter Start Time: 13:53





Mr. Yeager was seen today in follow-up of dysphagia following anterior cervical 

disckectomy. He continues to have a significant amount of dysphagia. He has michael 

been able to take in a small amount of water despite the current treatment.





- Objective


Resuscitation Status - Order Detail:





07/15/19 19:19


Resuscitation Status Routine 


   Resuscitation Status: FULL: Full Resuscitation


   Discussed with: POA: sister Ms.Anita PHILIP Reviewed: Yes


Vital Signs & Weight: 


 Vital Signs (12 hours)











  Temp Pulse Resp BP Pulse Ox


 


 07/19/19 13:25   78  16   100


 


 07/19/19 08:00  97.8 F  88  18  101/64  99


 


 07/19/19 06:44   78  14   100








 Weight











Admit Weight                   134 lb 7.712 oz


 


Weight                         134 lb 7.712 oz














I&O: 


 











 07/18/19 07/19/19 07/20/19





 06:59 06:59 06:59


 


Intake Total 2450 1210 


 


Balance 2450 1210 











Result Diagrams: 


 07/16/19 04:54





 07/18/19 05:34


Additional Labs: 


 Accuchecks











  07/19/19 07/19/19 07/18/19





  12:06 04:25 19:33


 


POC Glucose  122 H  215 H  223 H














  07/18/19





  15:41


 


POC Glucose  197 H














Phys Exam





- Physical Examination


HEENT: PERRLA


Respiratory: no wheezing, no rales


+ occasional rhonchi


Cardiovascular: RRR, no significant murmur, no rub


Gastrointestinal: soft, non-tender, no distention, positive bowel sounds


Musculoskeletal: no edema, pulses present





Dx/Plan


(1) Dysphagia


Code(s): R13.10 - DYSPHAGIA, UNSPECIFIED   Status: Acute   


Qualifiers: 


   Dysphagia type: unspecified   Qualified Code(s): R13.10 - Dysphagia, 

unspecified   





(2) Aspiration pneumonia


Code(s): J69.0 - PNEUMONITIS DUE TO INHALATION OF FOOD AND VOMIT   Status: 

Acute   


Qualifiers: 


   Laterality: right   Lung location: upper lobe of lung 





(3) Severe dehydration


Code(s): E86.0 - DEHYDRATION   Status: Acute   Comment: with electrolyte 

abnormalities, improving   





(4) DM2 (diabetes mellitus, type 2)


Status: Chronic   


Qualifiers: 


   Diabetes mellitus long term insulin use: without long term use 





(5) HTN (hypertension)


Code(s): I10 - ESSENTIAL (PRIMARY) HYPERTENSION   Status: Chronic   


Qualifiers: 


   Hypertension type: essential hypertension 


Comment: controlled   





(6) History of CVA (cerebrovascular accident)


Code(s): Z86.73 - PRSNL HX OF TIA (TIA), AND CEREB INFRC W/O RESID DEFICITS   

Status: Chronic   Comment: with right hemiplegia   





(7) Seizure


Status: Chronic   





- Plan





* Dysphagia- as a result of pharyngeal swelling post ACD.- continue IV steroids


* He has not been able to take in any nutrition in several days-will place a DHT


* Aspiration Pneumonia- continue Clindamycin


* HTN- blood pressure is stable


* DM- blood glucose is stable


* Seizure disorder- continue Keppra IV


* Post CVA- with right sided hemiparesis- stable


. surgery